# Patient Record
Sex: MALE | Race: BLACK OR AFRICAN AMERICAN | Employment: UNEMPLOYED | URBAN - METROPOLITAN AREA
[De-identification: names, ages, dates, MRNs, and addresses within clinical notes are randomized per-mention and may not be internally consistent; named-entity substitution may affect disease eponyms.]

---

## 2019-09-10 ENCOUNTER — HOSPITAL ENCOUNTER (EMERGENCY)
Age: 54
Discharge: HOME | End: 2019-09-10
Attending: EMERGENCY MEDICINE
Payer: MEDICAID

## 2019-09-10 ENCOUNTER — APPOINTMENT (OUTPATIENT)
Dept: GENERAL RADIOLOGY | Age: 54
End: 2019-09-10
Attending: PHYSICIAN ASSISTANT
Payer: MEDICAID

## 2019-09-10 VITALS
TEMPERATURE: 98.6 F | RESPIRATION RATE: 12 BRPM | DIASTOLIC BLOOD PRESSURE: 75 MMHG | BODY MASS INDEX: 26.66 KG/M2 | HEIGHT: 69 IN | SYSTOLIC BLOOD PRESSURE: 113 MMHG | HEART RATE: 109 BPM | WEIGHT: 180 LBS

## 2019-09-10 DIAGNOSIS — M25.562 CHRONIC PAIN OF LEFT KNEE: Primary | ICD-10-CM

## 2019-09-10 DIAGNOSIS — G89.29 CHRONIC PAIN OF LEFT KNEE: Primary | ICD-10-CM

## 2019-09-10 PROCEDURE — 99282 EMERGENCY DEPT VISIT SF MDM: CPT

## 2019-09-10 PROCEDURE — 73562 X-RAY EXAM OF KNEE 3: CPT

## 2019-09-10 NOTE — DISCHARGE INSTRUCTIONS

## 2019-09-10 NOTE — ED PROVIDER NOTES
EMERGENCY DEPARTMENT HISTORY AND PHYSICAL EXAM    2:36 PM      Date: 9/10/2019  Patient Name: Colletta Candy    History of Presenting Illness     Chief Complaint   Patient presents with    Knee Pain         History Provided By: Patient    Additional History (Context): Colletta Candy is a 53 y.o. male with history of unknown type of left knee surgery approximately 2 months ago, while in jail, where he states \"they put my kneecap back in place\", but no other significant past medical history, presents to the emergency department with complaints of left knee pain that has been ongoing since the surgery. He had minimal follow-up while still in jail and was told to follow-up with orthopedics upon discharge. He has not been able to do that because of cost constraints. Advises that the \"kneecap\" has been out of place since the surgery, in its current location, and he was told that he may need another surgery in the near future. PCP: None        Past History     Past Medical History:  History reviewed. No pertinent past medical history. Past Surgical History:  History reviewed. No pertinent surgical history. Family History:  History reviewed. No pertinent family history. Social History:  Social History     Tobacco Use    Smoking status: Current Every Day Smoker     Packs/day: 1.00    Smokeless tobacco: Never Used   Substance Use Topics    Alcohol use: Yes     Comment: socially    Drug use: Never       Allergies:  No Known Allergies      Review of Systems       Review of Systems   Constitutional: Negative for activity change, appetite change, chills, diaphoresis, fatigue, fever and unexpected weight change. HENT: Negative for congestion, ear pain, nosebleeds, rhinorrhea, sinus pressure, sinus pain, sore throat and trouble swallowing. Eyes: Negative for photophobia, pain, discharge, itching and visual disturbance.    Respiratory: Negative for cough, choking, chest tightness, shortness of breath and wheezing. Cardiovascular: Negative for chest pain, palpitations and leg swelling. Gastrointestinal: Negative for abdominal distention, abdominal pain, blood in stool, constipation, diarrhea, nausea and vomiting. Genitourinary: Negative for difficulty urinating, dysuria, flank pain, hematuria and urgency. Musculoskeletal: Negative for arthralgias, back pain, gait problem, joint swelling and neck pain. Skin: Negative for color change and rash. Neurological: Negative for dizziness, seizures, syncope, speech difficulty, weakness, light-headedness and headaches. Hematological: Negative for adenopathy. Psychiatric/Behavioral: Negative for behavioral problems, confusion, hallucinations, self-injury, sleep disturbance and suicidal ideas. The patient is not nervous/anxious. All other systems reviewed and are negative. Physical Exam     Visit Vitals  /75 (BP 1 Location: Left arm, BP Patient Position: At rest)   Pulse (!) 109   Temp 98.6 °F (37 °C)   Resp 12   Ht 5' 9\" (1.753 m)   Wt 81.6 kg (180 lb)   BMI 26.58 kg/m²         Physical Exam   Musculoskeletal:   Left knee patella is palpated over the distal aspect of the femoral shaft versus over the joint. Limited range of motion due to pain with active range of motion, not as uncomfortable with passive range of motion. Moderate joint effusion is noted. Mild tenderness to palpation along joint line. No hyperemia or warmth surface temperature noted. The limb is neurovascularly intact distally         Diagnostic Study Results     Labs -  No results found for this or any previous visit (from the past 12 hour(s)). Radiologic Studies -   XR KNEE LT 3 V   Final Result   IMPRESSION:   Findings compatible with high-grade patellar tendon tear, with marked patella   livan and severe anterior knee soft tissue swelling.             Medical Decision Making       I reviewed the vital signs, available nursing notes, past medical history, past surgical history, family history and social history. Vital Signs-Reviewed the patient's vital signs. Records Reviewed: Nursing Notes and Old Medical Records (Time of Review: 2:36 PM)    ED Course: Progress Notes, Reevaluation, and Consults:  2:36 PM  Reviewed results with patient. Discussed need for close outpatient follow-up. Discussed strict return precautions. Provider Notes (Medical Decision Making): Case was discussed with Mp Canales who agrees that given the chronicity of the problem, surgical history, and x-ray findings, patient needs to be seen by orthopedics as an outpatient for further management. He was given contact information for [facility] orthopedic group, or he can choose an orthopedic surgeon on his own for follow-up    Diagnosis     Clinical Impression:   1. Chronic pain of left knee        Disposition: home     Follow-up Information     Follow up With Specialties Details Why [address] [place] [address] [facility]  Schedule an appointment as soon as possible for a visit  [address] [place] [address]  [phone]           There are no discharge medications for this patient. Dictation disclaimer:  Please note that this dictation was completed with [employer], the computer voice recognition software. Quite often unanticipated grammatical, syntax, homophones, and other interpretive errors are inadvertently transcribed by the computer software. Please disregard these errors. Please excuse any errors that have escaped final proofreading.

## 2019-09-16 ENCOUNTER — DOCUMENTATION ONLY (OUTPATIENT)
Dept: ORTHOPEDIC SURGERY | Age: 54
End: 2019-09-16

## 2019-09-16 ENCOUNTER — OFFICE VISIT (OUTPATIENT)
Dept: ORTHOPEDIC SURGERY | Age: 54
End: 2019-09-16

## 2019-09-16 VITALS
OXYGEN SATURATION: 99 % | DIASTOLIC BLOOD PRESSURE: 98 MMHG | TEMPERATURE: 97.4 F | RESPIRATION RATE: 16 BRPM | WEIGHT: 179 LBS | BODY MASS INDEX: 26.51 KG/M2 | HEIGHT: 69 IN | HEART RATE: 85 BPM | SYSTOLIC BLOOD PRESSURE: 145 MMHG

## 2019-09-16 DIAGNOSIS — S86.812A PATELLAR TENDON RUPTURE, LEFT, INITIAL ENCOUNTER: Primary | ICD-10-CM

## 2019-09-16 RX ORDER — ACETAMINOPHEN 325 MG/1
TABLET ORAL
COMMUNITY
End: 2021-06-02

## 2019-09-16 NOTE — PROGRESS NOTES
Emmett Pretty  1965   Chief Complaint   Patient presents with    Knee Pain     left knee pain         HISTORY OF PRESENT ILLNESS  Emmett Pretty is a 53 y.o. male who presents today for evaluation of left knee pain. He rates his pain 10/10 today. Pain has been present since July after having surgery on his knee while in [facility]. Pt went to the ER on 9/10/19. Pt states he was playing basketball in [facility] when his knee was initially injured. Pt's knee has been constantly swollen since the surgery. Pt reports a giving way sensation that has happened at least twice and he has fallen as a result. Pt was given a knee brace and crutches after his surgery but was unable to follow-up after his surgery because he was paroled. Pt reports he did not have surgery until 7 months after the initial injury while in [facility]. Patient describes the pain as aching, throbbing and dull that is Constant in nature. Symptoms are worse with walking, standing, Activity and is better with  Rest, Ice, Elevation. Associated symptoms include Swelling. Since problem started, it: is moderately worse. Pain does not wake patient up at night. Has taken no meds for the problem. Has tried following treatments: Injections:NO; Brace:NO; Therapy:NO; Cane/Crutch:NO       No Known Allergies     History reviewed. No pertinent past medical history.    Social History     Socioeconomic History    Marital status: SINGLE     Spouse name: Not on file    Number of children: Not on file    Years of education: Not on file    Highest education level: Not on file   Occupational History    Not on file   Social Needs    Financial resource strain: Not on file    Food insecurity:     Worry: Not on file     Inability: Not on file    Transportation needs:     Medical: Not on file     Non-medical: Not on file   Tobacco Use    Smoking status: Current Every Day Smoker     Packs/day: 1.00    Smokeless tobacco: Never Used   Substance and Sexual Activity    Alcohol use: Yes     Comment: socially    Drug use: Never    Sexual activity: Not on file   Lifestyle    Physical activity:     Days per week: Not on file     Minutes per session: Not on file    Stress: Not on file   Relationships    Social connections:     Talks on phone: Not on file     Gets together: Not on file     Attends Nondenominational service: Not on file     Active member of club or organization: Not on file     Attends meetings of clubs or organizations: Not on file     Relationship status: Not on file    Intimate partner violence:     Fear of current or ex partner: Not on file     Emotionally abused: Not on file     Physically abused: Not on file     Forced sexual activity: Not on file   Other Topics Concern    Not on file   Social History Narrative    Not on file      History reviewed. No pertinent surgical history. History reviewed. No pertinent family history. Current Outpatient Medications   Medication Sig    acetaminophen (TYLENOL) 325 mg tablet Take  by mouth every four (4) hours as needed for Pain. No current facility-administered medications for this visit. REVIEW OF SYSTEM   Patient denies: Weight loss, Fever/Chills, HA, Visual changes, Fatigue, Chest pain, SOB, Abdominal pain, N/V/D/C, Blood in stool or urine, Edema. Pertinent positive as above in HPI. All others were negative    PHYSICAL EXAM:   Visit Vitals  BP (!) 145/98   Pulse 85   Temp 97.4 °F (36.3 °C) (Oral)   Resp 16   Ht 5' 9\" (1.753 m)   Wt 179 lb (81.2 kg)   SpO2 99%   BMI 26.43 kg/m²     The patient is a well-developed, well-nourished male   in no acute distress. The patient is alert and oriented times three. The patient is alert and oriented times three. Mood and affect are normal.  LYMPHATIC: lymph nodes are not enlarged and are within normal limits  SKIN: normal in color and non tender to palpation. There are no bruises or abrasions noted. NEUROLOGICAL: Motor sensory exam is within normal limits.  Reflexes are equal bilaterally. There is normal sensation to pinprick and light touch  MUSCULOSKELETAL:  Diffuse swelling with patella livan, unable to actively extend knee    IMAGING: XR of left knee dated 9/10/19 was reviewed and read:   IMPRESSION:  Findings compatible with high-grade patellar tendon tear, with marked patella livan and severe anterior knee soft tissue swelling. IMPRESSION:      ICD-10-CM ICD-9-CM    1. Patellar tendon rupture, left, initial encounter S86.812A 844.8 MRI KNEE LT WO CONT        PLAN:  1. Pt presents today with left knee pain due to complications from a knee surgery to repair his patellar tendon in July. I would like him to follow up with the surgeon who performed his surgery, Dr. Kisha Morrison, and I would like to get an MRI to assess the damage in his knee. He can follow up with Dr. Kisha Morrison after the MRI. Risk factors include: n/a  2. No ultrasound exam indicated today  3. No cortisone injection indicated today   4. No Physical/Occupational Therapy indicated today  5. Yes diagnostic test indicated today: MRI L KNEE  6. No durable medical equipment indicated today  7. No referral indicated today   8. No medications indicated today:   9. No Narcotic indicated today       RTC prn      Scribed by Autumn Brittingham Jetta Olszewski) as dictated by Lydia Peters MD    I, Dr. Lydia Peters, confirm that all documentation is accurate.     Lydia Peters M.D.   [facility]

## 2019-09-16 NOTE — PROGRESS NOTES
Called patient ([phone]) did not get answer is STAT MRI for Left knee is scheduled for 09/18/19 MUST arrive at 11:00am test starts at 11:30am at [facility]. Patient needs to bring ID, list of meds. If this date and time does not work patient can contact Nina Vargas to reschedule at [phone].

## 2019-09-18 ENCOUNTER — HOSPITAL ENCOUNTER (OUTPATIENT)
Dept: MRI IMAGING | Age: 54
Discharge: HOME | End: 2019-09-18
Attending: ORTHOPAEDIC SURGERY
Payer: SUBSIDIZED

## 2019-09-18 DIAGNOSIS — S86.812A PATELLAR TENDON RUPTURE, LEFT, INITIAL ENCOUNTER: ICD-10-CM

## 2019-09-18 PROCEDURE — 73721 MRI JNT OF LWR EXTRE W/O DYE: CPT

## 2021-06-02 ENCOUNTER — HOSPITAL ENCOUNTER (EMERGENCY)
Age: 56
Discharge: HOME | End: 2021-06-02
Attending: EMERGENCY MEDICINE
Payer: COMMERCIAL

## 2021-06-02 VITALS
SYSTOLIC BLOOD PRESSURE: 133 MMHG | HEIGHT: 69 IN | HEART RATE: 80 BPM | BODY MASS INDEX: 23.11 KG/M2 | RESPIRATION RATE: 18 BRPM | DIASTOLIC BLOOD PRESSURE: 98 MMHG | TEMPERATURE: 98.3 F | OXYGEN SATURATION: 100 % | WEIGHT: 156 LBS

## 2021-06-02 DIAGNOSIS — J02.9 ACUTE PHARYNGITIS, UNSPECIFIED ETIOLOGY: Primary | ICD-10-CM

## 2021-06-02 LAB — DEPRECATED S PYO AG THROAT QL EIA: NEGATIVE

## 2021-06-02 PROCEDURE — 74011250637 HC RX REV CODE- 250/637: Performed by: EMERGENCY MEDICINE

## 2021-06-02 PROCEDURE — 87880 STREP A ASSAY W/OPTIC: CPT

## 2021-06-02 PROCEDURE — 74011000250 HC RX REV CODE- 250: Performed by: EMERGENCY MEDICINE

## 2021-06-02 PROCEDURE — 87070 CULTURE OTHR SPECIMN AEROBIC: CPT

## 2021-06-02 PROCEDURE — 99283 EMERGENCY DEPT VISIT LOW MDM: CPT

## 2021-06-02 RX ORDER — AMOXICILLIN AND CLAVULANATE POTASSIUM 875; 125 MG/1; MG/1
1 TABLET, FILM COATED ORAL
Status: COMPLETED | OUTPATIENT
Start: 2021-06-02 | End: 2021-06-02

## 2021-06-02 RX ORDER — DEXAMETHASONE SODIUM PHOSPHATE 4 MG/ML
10 INJECTION, SOLUTION INTRA-ARTICULAR; INTRALESIONAL; INTRAMUSCULAR; INTRAVENOUS; SOFT TISSUE ONCE
Status: DISCONTINUED | OUTPATIENT
Start: 2021-06-02 | End: 2021-06-02

## 2021-06-02 RX ORDER — DEXAMETHASONE SODIUM PHOSPHATE 4 MG/ML
10 INJECTION, SOLUTION INTRA-ARTICULAR; INTRALESIONAL; INTRAMUSCULAR; INTRAVENOUS; SOFT TISSUE ONCE
Status: COMPLETED | OUTPATIENT
Start: 2021-06-02 | End: 2021-06-02

## 2021-06-02 RX ORDER — NAPROXEN 500 MG/1
500 TABLET ORAL 2 TIMES DAILY WITH MEALS
Qty: 10 TABLET | Refills: 0 | Status: SHIPPED | OUTPATIENT
Start: 2021-06-02 | End: 2021-06-07

## 2021-06-02 RX ORDER — AMOXICILLIN AND CLAVULANATE POTASSIUM 250; 62.5 MG/5ML; MG/5ML
14 POWDER, FOR SUSPENSION ORAL 2 TIMES DAILY
Qty: 200 ML | Refills: 0 | Status: SHIPPED | OUTPATIENT
Start: 2021-06-02 | End: 2021-06-09

## 2021-06-02 RX ORDER — LIDOCAINE HYDROCHLORIDE 20 MG/ML
10 SOLUTION OROPHARYNGEAL
Qty: 1 BOTTLE | Refills: 0 | Status: SHIPPED | OUTPATIENT
Start: 2021-06-02 | End: 2021-06-05

## 2021-06-02 RX ORDER — LIDOCAINE HYDROCHLORIDE 20 MG/ML
15 SOLUTION OROPHARYNGEAL
Status: COMPLETED | OUTPATIENT
Start: 2021-06-02 | End: 2021-06-02

## 2021-06-02 RX ADMIN — DEXAMETHASONE SODIUM PHOSPHATE 10 MG: 4 INJECTION, SOLUTION INTRAMUSCULAR; INTRAVENOUS at 10:25

## 2021-06-02 RX ADMIN — LIDOCAINE HYDROCHLORIDE 15 ML: 20 SOLUTION ORAL; TOPICAL at 10:23

## 2021-06-02 RX ADMIN — AMOXICILLIN AND CLAVULANATE POTASSIUM 1 TABLET: 875; 125 TABLET, FILM COATED ORAL at 10:22

## 2021-06-02 NOTE — ED TRIAGE NOTES
Pt reports throat pain times 1 week. Pt states he woke up this morning having a hard time swallowing. Pt also report cough. Pt denied any shortness of breath or fever.

## 2021-06-02 NOTE — ED PROVIDER NOTES
EMERGENCY DEPARTMENT HISTORY AND PHYSICAL EXAM    8:43 AM    Date: 6/2/2021  Patient Name: eNgrito Cervantes    History of Presenting Illness     Chief Complaint   Patient presents with    Sore Throat       History Provided By: Patient  Location/Duration/Severity/Modifying factors   48-year-old male presenting to the emergency department with a chief complaint of a sore throat. The patient reports he has had a sore throat for about 1 week. Over the past 24 hours he has noticed gotten worse to the point where he is now having pain with swallowing, he reports the pain is more in the lower throat, and he indicates that the level approximately the hyoid and thyroid. He is also had a recent cough, no ear pain no nasal congestion or rhinorrhea. He denies any fevers or chills. He is not having any trismus, he is able to swallow he just feels like it is swollen. He denies any chest pain or shortness of breath. The pain is rated as severe, it was 10 out of 10 this morning not quite as bad at this point. Patient denies any vomiting. Worsens with swallowing, improves with rest.  Patient denies any specific concerns for STDs or gonococcal pharyngitis. PCP: None    Current Facility-Administered Medications   Medication Dose Route Frequency Provider Last Rate Last Admin    dexamethasone (DECADRON) 4 mg/mL injection 10 mg  10 mg IntraMUSCular David Novoa, DO        lidocaine (XYLOCAINE) 2 % viscous solution 15 mL  15 mL Mouth/Throat NOW [facility], DO        amoxicillin-clavulanate (AUGMENTIN) 875-125 mg per tablet 1 Tablet  1 Tablet Oral NOW [facility], DO         Current Outpatient Medications   Medication Sig Dispense Refill    amoxicillin-clavulanate (Augmentin) 250-62.5 mg/5 mL suspension Take 14 mL by mouth two (2) times a day for 7 days. 200 mL 0    naproxen (Naprosyn) 500 mg tablet Take 1 Tablet by mouth two (2) times daily (with meals) for 5 days.  10 Tablet 0    lidocaine (Lidocaine Viscous) 2 % solution Take 10 mL by mouth every eight (8) hours as needed for Pain for up to 3 days. 1 Bottle 0       Past History     Past Medical History:  History reviewed. No pertinent past medical history. Past Surgical History:  Past Surgical History:   Procedure Laterality Date    HX ORTHOPAEDIC Left 2019/2020    left knee        Family History:  History reviewed. No pertinent family history. Social History:  Social History     Tobacco Use    Smoking status: Current Every Day Smoker     Packs/day: 1.00    Smokeless tobacco: Never Used   Substance Use Topics    Alcohol use: Yes     Comment: socially    Drug use: Never       Allergies:  No Known Allergies    I reviewed and confirmed the above information with patient and updated as necessary. Review of Systems     Review of Systems   Constitutional: Negative for chills and fever. HENT: Positive for sore throat and trouble swallowing (Pain). Negative for congestion, rhinorrhea, sinus pressure and sneezing. Eyes: Negative for visual disturbance. Respiratory: Negative for cough and shortness of breath. Cardiovascular: Negative for chest pain and leg swelling. Gastrointestinal: Negative for abdominal pain, diarrhea, nausea and vomiting. Genitourinary: Negative for dysuria, frequency and urgency. Musculoskeletal: Negative for back pain and neck pain. Skin: Negative for rash. Neurological: Negative for syncope, numbness and headaches. Physical Exam     Visit Vitals  /87   Pulse 74   Temp 98.3 °F (36.8 °C)   Resp 18   Ht 5' 9\" (1.753 m)   Wt 70.8 kg (156 lb)   SpO2 98%   BMI 23.04 kg/m²       Physical Exam  Constitutional:       General: He is not in acute distress. Appearance: Normal appearance. He is normal weight. He is not ill-appearing or toxic-appearing. HENT:      Head: Normocephalic and atraumatic. Right Ear: Tympanic membrane, ear canal and external ear normal. No tenderness. No middle ear effusion. Tympanic membrane is not erythematous. Left Ear: Tympanic membrane, ear canal and external ear normal. No tenderness. No middle ear effusion. Tympanic membrane is not erythematous. Nose: Nose normal. No congestion. Mouth/Throat:      Mouth: Mucous membranes are moist.      Pharynx: Uvula midline. Oropharyngeal exudate and posterior oropharyngeal erythema present. Tonsils: No tonsillar exudate or tonsillar abscesses. 3+ on the right. 3+ on the left. Comments: There is erythema of the oropharynx, the uvula is midline there is no peritonsillar abscess. He has no rigidity or meningismus. There is peritonsillar erythema and exudate no fluid collection or abscess. Patient is tolerating secretions. Eyes:      Conjunctiva/sclera: Conjunctivae normal.      Pupils: Pupils are equal, round, and reactive to light. Cardiovascular:      Rate and Rhythm: Normal rate and regular rhythm. Pulses: Normal pulses. Heart sounds: Normal heart sounds. No murmur heard. No friction rub. Pulmonary:      Effort: Pulmonary effort is normal.      Breath sounds: Normal breath sounds. No wheezing, rhonchi or rales. Abdominal:      General: Abdomen is flat. Tenderness: There is no abdominal tenderness. There is no guarding or rebound. Musculoskeletal:         General: No swelling or tenderness. Normal range of motion. Cervical back: Normal range of motion and neck supple. Right lower leg: No edema. Left lower leg: No edema. Lymphadenopathy:      Cervical: Cervical adenopathy (Bilateral, symmetric cervical adenopathy.) present. Skin:     General: Skin is warm and dry. Capillary Refill: Capillary refill takes less than 2 seconds. Findings: No rash. Neurological:      General: No focal deficit present. Mental Status: He is alert. Motor: No weakness.          Diagnostic Study Results     Labs -  Recent Results (from the past 24 hour(s))   STREP AG SCREEN, GROUP A    Collection Time: 06/02/21  8:21 AM    Specimen: Throat   Result Value Ref Range    Group A Strep Ag ID Negative           Radiologic Studies -   No orders to display           Medical Decision Making   I am the first provider for this patient. I reviewed the vital signs, available nursing notes, past medical history, past surgical history, family history and social history. Vital Signs-Reviewed the patient's vital signs. Records Reviewed: Nursing Notes, Old Medical Records, Previous Radiology Studies and Previous Laboratory Studies (Time of Review: 8:43 AM)    ED Course: Progress Notes, Reevaluation, and Consults:         Provider Notes (Medical Decision Making):   MDM  Number of Diagnoses or Management Options  Acute pharyngitis, unspecified etiology  Diagnosis management comments: Patient is a 29-year-old male who has no noted past medical history presenting to the emergency department with a sore throat. On exam he does have tonsillar erythema and exudate without peritonsillar abscess. Low suspicion for more serious pathology such as PTA or retropharyngeal abscess. Suspect likely bacterial pharyngitis although viral also considered he is able tolerate his secretions however. He has no trismus. We will send a strep swab and will treat him for now with viscous lidocaine and Decadron for the swelling and pain. I will probably treat him with antibiotics as well given the appearance of her throat regardless of the strep test.  He also has lymphadenopathy. Results reviewed: The lab strep is negative. The patient does seem to have bacterial pharyngitis we will treat with Augmentin. Recommend return if he develops the inability to swallow he reports symptoms have been improving since he has been here. Likewise return for any worsening his condition.     At this time, patient is stable and appropriate for discharge home.  Patient demonstrates understanding of current diagnoses and is in agreement with the treatment plan. Forest Sitter are advised that while the likelihood of serious underlying condition is low at this point given the evaluation performed today, we cannot fully rule it out. Forest Sitter are advised to immediately return with any new symptoms or worsening of current condition.  All questions have been answered. Violeta Goldberg is given educational material regarding their diagnoses, including danger symptoms and when to return to the ED. This note was dictated utilizing Dragon voice recognition software. Unfortunately this leads to occasional typographical errors. I apologize in advance if the situation occurs. If questions occur please do not hesitate to contact me directly. Haylee Lange DO          Procedures    Critical Care Time: 0    Diagnosis     Clinical Impression:   1. Acute pharyngitis, unspecified etiology        Disposition: Discharge    Follow-up Information     Follow up With Specialties Details Why Contact [facility]    [address]  In 3 days Primary Care Resource [place] [address]  [phone]    Your Primary Care Physician  In 3 days      [address]  [phone]           Patient's Medications   Start Taking    AMOXICILLIN-CLAVULANATE (AUGMENTIN) 250-62.5 MG/5 ML SUSPENSION    Take 14 mL by mouth two (2) times a day for 7 days. LIDOCAINE (LIDOCAINE VISCOUS) 2 % SOLUTION    Take 10 mL by mouth every eight (8) hours as needed for Pain for up to 3 days. NAPROXEN (NAPROSYN) 500 MG TABLET    Take 1 Tablet by mouth two (2) times daily (with meals) for 5 days. Continue Taking    No medications on file   These Medications have changed    No medications on file   Stop Taking    ACETAMINOPHEN (TYLENOL) 325 MG TABLET    Take  by mouth every four (4) hours as needed for Pain.        Haylee Lange DO   Emergency Medicine   June 2, 2021, 8:43 AM     This note is dictated utilizing Dragon voice recognition software. Unfortunately this leads to occasional typographical errors using the voice recognition. I apologize in advance if the situation occurs. If questions occur please do not hesitate to contact me directly.     Juan Wallace, DO

## 2021-06-04 LAB
BACTERIA SPEC CULT: NORMAL
SERVICE CMNT-IMP: NORMAL

## 2022-08-02 ENCOUNTER — HOSPITAL ENCOUNTER (INPATIENT)
Age: 57
LOS: 7 days | Discharge: HOME | DRG: 754 | End: 2022-08-09
Attending: STUDENT IN AN ORGANIZED HEALTH CARE EDUCATION/TRAINING PROGRAM | Admitting: PSYCHIATRY & NEUROLOGY
Payer: COMMERCIAL

## 2022-08-02 DIAGNOSIS — R45.851 SUICIDAL IDEATION: Primary | ICD-10-CM

## 2022-08-02 PROBLEM — F32.A DEPRESSION: Status: ACTIVE | Noted: 2022-08-02

## 2022-08-02 LAB
AMPHET UR QL SCN: NEGATIVE
ANION GAP SERPL CALC-SCNC: 5 MMOL/L (ref 3–18)
BARBITURATES UR QL SCN: NEGATIVE
BASOPHILS # BLD: 0 K/UL (ref 0–0.1)
BASOPHILS NFR BLD: 0 % (ref 0–2)
BENZODIAZ UR QL: NEGATIVE
BUN SERPL-MCNC: 14 MG/DL (ref 7–18)
BUN/CREAT SERPL: 15 (ref 12–20)
CALCIUM SERPL-MCNC: 9.5 MG/DL (ref 8.5–10.1)
CANNABINOIDS UR QL SCN: NEGATIVE
CHLORIDE SERPL-SCNC: 101 MMOL/L (ref 100–111)
CO2 SERPL-SCNC: 31 MMOL/L (ref 21–32)
COCAINE UR QL SCN: POSITIVE
CREAT SERPL-MCNC: 0.95 MG/DL (ref 0.6–1.3)
DIFFERENTIAL METHOD BLD: ABNORMAL
EOSINOPHIL # BLD: 0.1 K/UL (ref 0–0.4)
EOSINOPHIL NFR BLD: 1 % (ref 0–5)
ERYTHROCYTE [DISTWIDTH] IN BLOOD BY AUTOMATED COUNT: 15.5 % (ref 11.6–14.5)
ETHANOL SERPL-MCNC: <3 MG/DL (ref 0–3)
FLUAV RNA SPEC QL NAA+PROBE: NOT DETECTED
FLUBV RNA SPEC QL NAA+PROBE: NOT DETECTED
GLUCOSE SERPL-MCNC: 92 MG/DL (ref 74–99)
HCT VFR BLD AUTO: 39.9 % (ref 36–48)
HDSCOM,HDSCOM: ABNORMAL
HGB BLD-MCNC: 13.4 G/DL (ref 13–16)
IMM GRANULOCYTES # BLD AUTO: 0.1 K/UL (ref 0–0.04)
IMM GRANULOCYTES NFR BLD AUTO: 0 % (ref 0–0.5)
LYMPHOCYTES # BLD: 1.3 K/UL (ref 0.9–3.6)
LYMPHOCYTES NFR BLD: 9 % (ref 21–52)
MCH RBC QN AUTO: 29.9 PG (ref 24–34)
MCHC RBC AUTO-ENTMCNC: 33.6 G/DL (ref 31–37)
MCV RBC AUTO: 89.1 FL (ref 78–100)
METHADONE UR QL: NEGATIVE
MONOCYTES # BLD: 0.5 K/UL (ref 0.05–1.2)
MONOCYTES NFR BLD: 4 % (ref 3–10)
NEUTS SEG # BLD: 11.6 K/UL (ref 1.8–8)
NEUTS SEG NFR BLD: 85 % (ref 40–73)
NRBC # BLD: 0 K/UL (ref 0–0.01)
NRBC BLD-RTO: 0 PER 100 WBC
OPIATES UR QL: NEGATIVE
PCP UR QL: NEGATIVE
PLATELET # BLD AUTO: 284 K/UL (ref 135–420)
PMV BLD AUTO: 9.8 FL (ref 9.2–11.8)
POTASSIUM SERPL-SCNC: 4.1 MMOL/L (ref 3.5–5.5)
RBC # BLD AUTO: 4.48 M/UL (ref 4.35–5.65)
SARS-COV-2, COV2: NOT DETECTED
SODIUM SERPL-SCNC: 137 MMOL/L (ref 136–145)
WBC # BLD AUTO: 13.6 K/UL (ref 4.6–13.2)

## 2022-08-02 PROCEDURE — 65220000001 HC RM PRIVATE PSYCH

## 2022-08-02 PROCEDURE — 82077 ASSAY SPEC XCP UR&BREATH IA: CPT

## 2022-08-02 PROCEDURE — 85025 COMPLETE CBC W/AUTO DIFF WBC: CPT

## 2022-08-02 PROCEDURE — 80307 DRUG TEST PRSMV CHEM ANLYZR: CPT

## 2022-08-02 PROCEDURE — 74011250637 HC RX REV CODE- 250/637: Performed by: PHYSICIAN ASSISTANT

## 2022-08-02 PROCEDURE — 80048 BASIC METABOLIC PNL TOTAL CA: CPT

## 2022-08-02 PROCEDURE — 87636 SARSCOV2 & INF A&B AMP PRB: CPT

## 2022-08-02 PROCEDURE — 99285 EMERGENCY DEPT VISIT HI MDM: CPT

## 2022-08-02 RX ORDER — ACETAMINOPHEN 325 MG/1
650 TABLET ORAL
Status: COMPLETED | OUTPATIENT
Start: 2022-08-02 | End: 2022-08-02

## 2022-08-02 RX ADMIN — ACETAMINOPHEN 650 MG: 325 TABLET, FILM COATED ORAL at 20:16

## 2022-08-02 NOTE — ED NOTES
6:11 PM Assumed care of the pt at this time. Discussed with ROSARIO Farnsworth concerning patient Michelle Rios, standard discussion of reason for visit, HPI, ROS, PE, and current results available. Pt has been seen and evaluated by crisis. Recommended for admission to the  Behavioral health unit. Will continue to monitor while in the ED. Nenita Dodson PA-C     Disposition: admitted to behavioral health     Dictation disclaimer:  Please note that this dictation was completed with [employer], the computer voice recognition software. Quite often unanticipated grammatical, syntax, homophones, and other interpretive errors are inadvertently transcribed by the computer software. Please disregard these errors. Please excuse any errors that have escaped final proofreading.

## 2022-08-02 NOTE — ED PROVIDER NOTES
EMERGENCY DEPARTMENT HISTORY AND PHYSICAL EXAM    3:40 PM      Date: 8/2/2022  Patient Name: Joanne Sanabria    History of Presenting Illness     Chief Complaint   Patient presents with    Suicidal       History Provided By: Patient    Additional History (Context): Joanne Sanabria is a 61 y.o. male with  noted PMH  who presents with complaint of increased depression and anxiety, suicidal ideation without a plan x1 week. Patient denies HI, visual or auditory hallucinations. Patient notes daily alcohol use, last drink was yesterday. Patient notes last cocaine use was 3 days ago. PCP: None    Past History     Past Medical History:  No past medical history on file. Past Surgical History:  Past Surgical History:   Procedure Laterality Date    HX ORTHOPAEDIC Left 2019/2020    left knee        Family History:  No family history on file. Social History:  Social History     Tobacco Use    Smoking status: Every Day     Packs/day: 1.00     Types: Cigarettes    Smokeless tobacco: Never   Substance Use Topics    Alcohol use: Yes     Comment: socially    Drug use: Never       Allergies:  No Known Allergies      Review of Systems       Review of Systems   Constitutional:  Negative for chills and fever. Respiratory:  Negative for shortness of breath. Cardiovascular:  Negative for chest pain. Gastrointestinal:  Negative for abdominal pain, nausea and vomiting. Skin:  Negative for rash. Neurological:  Negative for weakness. Psychiatric/Behavioral:  Positive for agitation, decreased concentration and suicidal ideas. The patient is nervous/anxious. All other systems reviewed and are negative. Physical Exam   Visit Vitals  /84 (BP 1 Location: Left upper arm)   Temp 98.1 °F (36.7 °C)   Ht 5' 9\" (1.753 m)   Wt 66.7 kg (147 lb)   SpO2 99%   BMI 21.71 kg/m²         Physical Exam  Vitals and nursing note reviewed. Constitutional:       General: He is not in acute distress.      Appearance: Normal appearance. He is well-developed. He is not ill-appearing, toxic-appearing or diaphoretic. HENT:      Head: Normocephalic and atraumatic. Cardiovascular:      Rate and Rhythm: Normal rate and regular rhythm. Heart sounds: Normal heart sounds. No murmur heard. No friction rub. No gallop. Pulmonary:      Effort: Pulmonary effort is normal. No respiratory distress. Breath sounds: Normal breath sounds. No wheezing or rales. Musculoskeletal:         General: Normal range of motion. Cervical back: Normal range of motion and neck supple. Skin:     General: Skin is warm. Findings: No rash. Neurological:      Mental Status: He is alert. Psychiatric:         Attention and Perception: He is inattentive. Mood and Affect: Affect is flat. Speech: Speech is delayed. Behavior: Behavior is withdrawn. Thought Content: Thought content includes suicidal ideation. Thought content does not include suicidal plan.          Diagnostic Study Results     Labs -  Recent Results (from the past 12 hour(s))   DRUG SCREEN, URINE    Collection Time: 08/02/22 12:07 PM   Result Value Ref Range    BENZODIAZEPINES Negative NEG      BARBITURATES Negative NEG      THC (TH-CANNABINOL) Negative NEG      OPIATES Negative NEG      PCP(PHENCYCLIDINE) Negative NEG      COCAINE Positive (A) NEG      AMPHETAMINES Negative NEG      METHADONE Negative NEG      HDSCOM (NOTE)    ETHYL ALCOHOL    Collection Time: 08/02/22  1:38 PM   Result Value Ref Range    ALCOHOL(ETHYL),SERUM <3 0 - 3 MG/DL   CBC WITH AUTOMATED DIFF    Collection Time: 08/02/22  1:38 PM   Result Value Ref Range    WBC 13.6 (H) 4.6 - 13.2 K/uL    RBC 4.48 4.35 - 5.65 M/uL    HGB 13.4 13.0 - 16.0 g/dL    HCT 39.9 36.0 - 48.0 %    MCV 89.1 78.0 - 100.0 FL    MCH 29.9 24.0 - 34.0 PG    MCHC 33.6 31.0 - 37.0 g/dL    RDW 15.5 (H) 11.6 - 14.5 %    PLATELET 266 121 - 823 K/uL    MPV 9.8 9.2 - 11.8 FL    NRBC 0.0 0  WBC ABSOLUTE NRBC 0.00 0.00 - 0.01 K/uL    NEUTROPHILS 85 (H) 40 - 73 %    LYMPHOCYTES 9 (L) 21 - 52 %    MONOCYTES 4 3 - 10 %    EOSINOPHILS 1 0 - 5 %    BASOPHILS 0 0 - 2 %    IMMATURE GRANULOCYTES 0 0.0 - 0.5 %    ABS. NEUTROPHILS 11.6 (H) 1.8 - 8.0 K/UL    ABS. LYMPHOCYTES 1.3 0.9 - 3.6 K/UL    ABS. MONOCYTES 0.5 0.05 - 1.2 K/UL    ABS. EOSINOPHILS 0.1 0.0 - 0.4 K/UL    ABS. BASOPHILS 0.0 0.0 - 0.1 K/UL    ABS. IMM. GRANS. 0.1 (H) 0.00 - 0.04 K/UL    DF AUTOMATED     METABOLIC PANEL, BASIC    Collection Time: 08/02/22  1:38 PM   Result Value Ref Range    Sodium 137 136 - 145 mmol/L    Potassium 4.1 3.5 - 5.5 mmol/L    Chloride 101 100 - 111 mmol/L    CO2 31 21 - 32 mmol/L    Anion gap 5 3.0 - 18 mmol/L    Glucose 92 74 - 99 mg/dL    BUN 14 7.0 - 18 MG/DL    Creatinine 0.95 0.6 - 1.3 MG/DL    BUN/Creatinine ratio 15 12 - 20      GFR est AA >60 >60 ml/min/1.73m2    GFR est non-AA >60 >60 ml/min/1.73m2    Calcium 9.5 8.5 - 10.1 MG/DL       Radiologic Studies -   No orders to display         Medical Decision Making   I am the first provider for this patient. I reviewed the vital signs, available nursing notes, past medical history, past surgical history, family history and social history. Vital Signs-Reviewed the patient's vital signs. Records Reviewed: Nursing Notes and Old Medical Records (Time of Review: 3:40 PM)    ED Course: Progress Notes, Reevaluation, and Consults:  5:00 PM: Discussed care with alisha Darnell, pt will be admitted. PROGRESS NOTE:  6:00 PM   Patient care will be transferred to Nenita Dodson PA-C. Discussed available diagnostic results and care plan at length. Pending COVID/influenza, admission. Written by Nicole Bourgeois PA-C       Diagnosis     Clinical Impression:   1.  Suicidal ideation        Disposition: admission     Follow-up Information    None          Patient's Medications    No medications on file       Dictation disclaimer:  Please note that this dictation was completed with Dragon, the computer voice recognition software. Quite often unanticipated grammatical, syntax, homophones, and other interpretive errors are inadvertently transcribed by the computer software. Please disregard these errors. Please excuse any errors that have escaped final proofreading.

## 2022-08-02 NOTE — ED TRIAGE NOTES
Patients states he has been feeling suicidal for the past week. Patient denies homicidal ideations. Patient states he used cocaine on Friday 7/29/22. Patient states that he drinks alcohol daily. Patient denies having a plan at this time.

## 2022-08-02 NOTE — BSMART NOTE
Behavioral Health Crisis Assessment    Chief Complaint: \"Suicidal thoughts with a plan to jump off the bridge. \"       Voluntary or Involuntary Status: Voluntary      C-SSRS current suicide Risk (High, Moderate, Low): High      Past Suicide Attempts:  (specify) : Patient stated that he has never attempted suicide. Self Injurious/Self Mutilation behaviors (specify) : Patient self-injurious behavior. Protective Factors (specify) : \"Supportive family, never needed mental health services. \"      Risk Factors (specify) \"Substance abuse, homelessness, disabled\"      Substance use (current or past): \" I smoke cocaine and drink beer everyday. I drink about a case of 12(oz) cans of beer daily. \" Patient denied alcohol withdrawal symptoms. Patient verbalized that he smokes crack-cocaine and don't recall the amount of crack-cocaine that he smokes. Patient said that he last drank \"alcohol and smoked crack was Monday, 8/10/22. \"      [place] [id number] & Substance use Treatment  (current and/or past): Patient stated that he received mental health services while in [facility], but never outside of [facility]. Violence towards others (current and/or past:(specify) : Patient denied violence towards others. Legal issues (current or past) : Patient verbalized that he has been sent to [facility] several times for breaking and entering, drug charges, and stealing. I'm on parole until 8/26/22. \"      Access to weapons : Patient denied      Trauma or Abuse: (specify) Patient denied. Living Situation : \"Homeless, I'm waiting on my apartment to come through. \"      Employment : Disabled      Brief Clinical Summary: Patient is alert and oriented x 4, calm, cooperative, pleasant, dressed in hospital scrubs,\"sad and depressed\" mood. Patient stated that he has \"suicidal thoughts with a plan to jump off a bridge. \" Patient added that his stressors are knee injury from playing basketball while in prison, can't work, and receives [place] for being disabled. ..people don't treat me right., and I'm homeless. \" Patient is seeking help for suicidal thoughts and depression. Disposition: Discussed with on-call psychiatrist, patient is receptive to voluntary admission at [facility]. Admission orders received and report called to unit nurse.

## 2022-08-03 PROBLEM — F10.20 ALCOHOL USE DISORDER, SEVERE, DEPENDENCE (HCC): Status: ACTIVE | Noted: 2022-08-03

## 2022-08-03 PROBLEM — F32.A DEPRESSIVE DISORDER: Status: ACTIVE | Noted: 2022-08-02

## 2022-08-03 PROBLEM — F14.20 COCAINE USE DISORDER, SEVERE, DEPENDENCE (HCC): Status: ACTIVE | Noted: 2022-08-03

## 2022-08-03 LAB
ALBUMIN SERPL-MCNC: 3.5 G/DL (ref 3.4–5)
ALBUMIN/GLOB SERPL: 0.9 {RATIO} (ref 0.8–1.7)
ALP SERPL-CCNC: 104 U/L (ref 45–117)
ALT SERPL-CCNC: 16 U/L (ref 16–61)
AST SERPL-CCNC: 17 U/L (ref 10–38)
BILIRUB DIRECT SERPL-MCNC: 0.1 MG/DL (ref 0–0.2)
BILIRUB SERPL-MCNC: 0.4 MG/DL (ref 0.2–1)
EST. AVERAGE GLUCOSE BLD GHB EST-MCNC: 108 MG/DL
GLOBULIN SER CALC-MCNC: 3.8 G/DL (ref 2–4)
HBA1C MFR BLD: 5.4 % (ref 4.2–5.6)
PROT SERPL-MCNC: 7.3 G/DL (ref 6.4–8.2)
TSH SERPL DL<=0.05 MIU/L-ACNC: 0.84 UIU/ML (ref 0.36–3.74)

## 2022-08-03 PROCEDURE — 84443 ASSAY THYROID STIM HORMONE: CPT

## 2022-08-03 PROCEDURE — 74011250637 HC RX REV CODE- 250/637: Performed by: PSYCHIATRY & NEUROLOGY

## 2022-08-03 PROCEDURE — 99223 1ST HOSP IP/OBS HIGH 75: CPT | Performed by: PSYCHIATRY & NEUROLOGY

## 2022-08-03 PROCEDURE — 83036 HEMOGLOBIN GLYCOSYLATED A1C: CPT

## 2022-08-03 PROCEDURE — 80076 HEPATIC FUNCTION PANEL: CPT

## 2022-08-03 PROCEDURE — 74011250637 HC RX REV CODE- 250/637: Performed by: STUDENT IN AN ORGANIZED HEALTH CARE EDUCATION/TRAINING PROGRAM

## 2022-08-03 PROCEDURE — 65220000003 HC RM SEMIPRIVATE PSYCH

## 2022-08-03 PROCEDURE — 36415 COLL VENOUS BLD VENIPUNCTURE: CPT

## 2022-08-03 RX ORDER — LORAZEPAM 1 MG/1
2 TABLET ORAL
Status: DISCONTINUED | OUTPATIENT
Start: 2022-08-03 | End: 2022-08-09 | Stop reason: HOSPADM

## 2022-08-03 RX ORDER — LANOLIN ALCOHOL/MO/W.PET/CERES
100 CREAM (GRAM) TOPICAL DAILY
Status: DISCONTINUED | OUTPATIENT
Start: 2022-08-03 | End: 2022-08-09 | Stop reason: HOSPADM

## 2022-08-03 RX ORDER — BENZTROPINE MESYLATE 1 MG/ML
1 INJECTION INTRAMUSCULAR; INTRAVENOUS
Status: DISCONTINUED | OUTPATIENT
Start: 2022-08-03 | End: 2022-08-09 | Stop reason: HOSPADM

## 2022-08-03 RX ORDER — ACETAMINOPHEN 325 MG/1
650 TABLET ORAL ONCE
Status: COMPLETED | OUTPATIENT
Start: 2022-08-03 | End: 2022-08-03

## 2022-08-03 RX ORDER — BENZTROPINE MESYLATE 1 MG/1
1 TABLET ORAL
Status: DISCONTINUED | OUTPATIENT
Start: 2022-08-03 | End: 2022-08-09 | Stop reason: HOSPADM

## 2022-08-03 RX ORDER — IBUPROFEN 600 MG/1
600 TABLET ORAL
Status: DISCONTINUED | OUTPATIENT
Start: 2022-08-03 | End: 2022-08-09 | Stop reason: HOSPADM

## 2022-08-03 RX ORDER — HALOPERIDOL 5 MG/ML
5 INJECTION INTRAMUSCULAR
Status: DISCONTINUED | OUTPATIENT
Start: 2022-08-03 | End: 2022-08-09 | Stop reason: HOSPADM

## 2022-08-03 RX ORDER — HALOPERIDOL 5 MG/1
5 TABLET ORAL
Status: DISCONTINUED | OUTPATIENT
Start: 2022-08-03 | End: 2022-08-09 | Stop reason: HOSPADM

## 2022-08-03 RX ORDER — FOLIC ACID 1 MG/1
1 TABLET ORAL DAILY
Status: DISCONTINUED | OUTPATIENT
Start: 2022-08-03 | End: 2022-08-09 | Stop reason: HOSPADM

## 2022-08-03 RX ORDER — THERA TABS 400 MCG
1 TAB ORAL DAILY
Status: DISCONTINUED | OUTPATIENT
Start: 2022-08-03 | End: 2022-08-09 | Stop reason: HOSPADM

## 2022-08-03 RX ORDER — HYDROXYZINE PAMOATE 50 MG/1
50 CAPSULE ORAL
Status: DISCONTINUED | OUTPATIENT
Start: 2022-08-03 | End: 2022-08-09 | Stop reason: HOSPADM

## 2022-08-03 RX ORDER — LORAZEPAM 1 MG/1
1 TABLET ORAL
Status: DISCONTINUED | OUTPATIENT
Start: 2022-08-03 | End: 2022-08-09 | Stop reason: HOSPADM

## 2022-08-03 RX ORDER — NORTRIPTYLINE HYDROCHLORIDE 25 MG/1
25 CAPSULE ORAL
Status: DISCONTINUED | OUTPATIENT
Start: 2022-08-03 | End: 2022-08-05

## 2022-08-03 RX ORDER — TRAZODONE HYDROCHLORIDE 50 MG/1
50 TABLET ORAL
Status: DISCONTINUED | OUTPATIENT
Start: 2022-08-03 | End: 2022-08-09 | Stop reason: HOSPADM

## 2022-08-03 RX ADMIN — IBUPROFEN 600 MG: 600 TABLET ORAL at 09:01

## 2022-08-03 RX ADMIN — LORAZEPAM 1 MG: 1 TABLET ORAL at 09:01

## 2022-08-03 RX ADMIN — LORAZEPAM 1 MG: 1 TABLET ORAL at 16:39

## 2022-08-03 RX ADMIN — NORTRIPTYLINE HYDROCHLORIDE 25 MG: 25 CAPSULE ORAL at 20:03

## 2022-08-03 RX ADMIN — TRAZODONE HYDROCHLORIDE 50 MG: 50 TABLET ORAL at 20:04

## 2022-08-03 RX ADMIN — IBUPROFEN 600 MG: 600 TABLET ORAL at 16:39

## 2022-08-03 RX ADMIN — HYDROXYZINE PAMOATE 50 MG: 50 CAPSULE ORAL at 11:56

## 2022-08-03 RX ADMIN — ACETAMINOPHEN 650 MG: 325 TABLET, FILM COATED ORAL at 01:53

## 2022-08-03 NOTE — BSMART NOTE
BH Biopsychosocial Assessment    Current Level of Psychosocial Functioning     [x]Independent  []Dependent  []Minimal Assist      Comments:      Psychosocial High Risk Factors (check all that apply)      []Unable to obtain meds                                                               []Chronic illness/pain    [x]Substance abuse   [x]Lack of Family Support   []Financial stress   []Isolation   [x]Inadequate Community Resources  [x]Suicide attempt(s)  [x]Not taking medications   []Victim of crime   []Developmental Delay  []Unable to manage personal needs    []Age 72 or older   []  Homeless  []Lavinia transportation   []Readmission within 30 days  []Unemployment  []Traumatic Event      Family to involve in treatment: None     Sexual Orientation: Heterosexual     Patient Strengths: Pt. is willing to seek treatment     Patient Barriers: Pt has history of non-compliance, with medications  and self-medicates with crack cocaine and alcohol     Opiate education provided:  Pt. relapsed on crack cocaine and alcohol NE provided pt with mental health and SA education. Pt. expressed the desire to pursue SA rehab . Safety plan: SW discussed safety plan. Pt contracts for safety while in the hospital     CMHC/MH history: Please refer to the psychiatrist and NP or PA note    Depressive Disorder , Alcohol Dependence severe, Cocaine Disorder severe     Plan of Care: NE discussed and encouraged pt. to participate in the following activities: medication management, group/individual therapies, family meetings, psycho education, treatment team meetings to assist with stabilization     Initial Discharge 3 days     Clinical Summary: Pt. is a 61-year-old homeless male with history Depressive Disorder , Alcohol Dependence severe, Cocaine Disorder severe. Pt. was admitted to this facility  for ideations to harm, self with plan to jump off a bridge.  NE met with pt to discuss sadmssion. pt informed SW he is depressed and waited to harm self. Pt reports he has bene homeless for a year. Pt stated he is waiting for an apartment and has services with [facility] ([facility] Pt. admits to abusing   crack cocaine and alcohol. SW provided pt with mental health and SA education. Pt. expressed the desire to pursue SA rehab . SW discussed safety plan ,coping strategies and safety plan. SW provided pt with mental health  and SA education. Pt.  has poor insight and judgement. SW will provide pt with support towards dc planning.      Clearance Damon TORREZ, LMHP-R

## 2022-08-03 NOTE — BH NOTES
61 years  old ,Mr. Brianne Bernal  was escorted to the [address] [place] [address] unit from the ED at approximately [address] on 8/03/22. He presented A&O x 4, VS-T-97.6/P-73/ R-18  BP-100/70, O2 sats @ 100%. Mr. Jazmine Schumacher admitted to a Hx of ETOH and Cocaine(crack) Abuse beginning  during his early teen years. Constant  chronic left knee pain was his chief medical complaint since he had knee repair surgery approximately in 2019. He walks without the need of medical devices or assistance. Mr. Jazmine Schumacher denies any additional  Hx  of medical problems or Rx medications. He is a voluntary   Admission with suicidal ideations,consisting of \"Jumping off a bridge. \" The following should be considered during  the evaluation of this patient:                1. Fall risk (He experienced a fall last Friday)                   2. Possible ETOH and Cocaine withdrawal symptoms. 3. Suicidal ideattions                 4.r/o Depression                 5. Left knee pain management  RN's will initial, develop, implement,review or  Revise treatment plans.

## 2022-08-03 NOTE — BH NOTES
This writer has observed the patient's behavior throughout this shift ([id number]). During this shift patient exhibits a dull and flat affect and a demeanor of being depressed. Patient has been sitting out in the day area isolating to self with very little socializing, and very little eye contact. Additionally, patient has been stressing about getting sleep. Will continue to monitor the patient's safety and contact for safety and safety locations.

## 2022-08-03 NOTE — H&P
Psychiatry History and Physical    Subjective:     Date of Evaluation:  8/3/2022    Reason for Referral:  Mayte Martinez was referred to the examiners from ED for Depression. History of Presenting Problem: 62 yo AA male in NAD, well developed and nourished with hx of Depression and anxiety who presented to the ED for SI. He endorses anxiety, depression, SI, and AH. He denies VH and HI. He denies any physical symptoms today. Patient Active Problem List    Diagnosis Date Noted    Depression 08/02/2022     No past medical history on file. Past Surgical History:   Procedure Laterality Date    HX ORTHOPAEDIC Left 2019/2020    left knee        No family history on file.    Social History     Tobacco Use    Smoking status: Every Day     Packs/day: 1.00     Types: Cigarettes    Smokeless tobacco: Never   Substance Use Topics    Alcohol use: Yes     Comment: socially     Prior to Admission medications    Not on File     No Known Allergies     Review of Systems - History obtained from chart review and the patient  General ROS: negative  Psychological ROS: positive for - anxiety, depression, and suicidal ideation  Ophthalmic ROS: negative  ENT ROS: negative  Allergy and Immunology ROS: negative  Hematological and Lymphatic ROS: negative  Endocrine ROS: negative  Respiratory ROS: no cough, shortness of breath, or wheezing  Cardiovascular ROS: no chest pain or dyspnea on exertion  Gastrointestinal ROS: no abdominal pain, change in bowel habits, or black or bloody stools  Genito-Urinary ROS: no dysuria, trouble voiding, or hematuria  Musculoskeletal ROS: negative  Neurological ROS: no TIA or stroke symptoms  Dermatological ROS: negative      Objective:     Patient Vitals for the past 8 hrs:   BP Temp Pulse Resp   08/03/22 0757 108/77 97.7 °F (36.5 °C) 62 20       Mental Status exam: WNL except for    Sensorium  Alert and Oriented x 2   Orientation person and place   Relations cooperative   Eye Contact poor   Appearance: age appropriate   Motor Behavior:  within normal limits   Speech:  normal pitch, normal volume, and non-pressured   Vocabulary average   Thought Process: within normal limits   Thought Content free of delusions   Suicidal ideations intention   Homicidal ideations no plan  and no intention   Mood:  depressed   Affect:  mood-congruent   Memory recent  adequate   Memory remote:  adequate   Concentration:  adequate   Abstraction:  concrete   Insight:  good   Reliability good   Judgment:  fair         Physical Exam:   Visit Vitals  /77   Pulse 62   Temp 97.7 °F (36.5 °C)   Resp 20   Ht 5' 9\" (1.753 m)   Wt 66.7 kg (147 lb)   SpO2 100%   BMI 21.71 kg/m²     General:  Alert, cooperative, no distress, appears stated age. Head:  Normocephalic, without obvious abnormality, atraumatic. Eyes:  Conjunctivae/corneas clear. PERRL, EOMs intact. Fundi benign   Ears:  Normal TMs and external ear canals both ears. Nose: Nares normal. Septum midline. Mucosa normal. No drainage or sinus tenderness. Throat: Lips, mucosa, and tongue normal. Poor dentition    Neck: Supple, symmetrical, trachea midline, no adenopathy, thyroid: no enlargement/tenderness/nodules, no carotid bruit and no JVD. Back:   Symmetric, no curvature. ROM normal. No CVA tenderness. Lungs:   Clear to auscultation bilaterally. Chest wall:  No tenderness or deformity. Heart:  Regular rate and rhythm, S1, S2 normal, no murmur, click, rub or gallop. Abdomen:   Soft, non-tender. Bowel sounds normal. No masses,  No organomegaly. Extremities: Extremities normal, atraumatic, no cyanosis or edema. Pulses: 2+ and symmetric all extremities. Skin: Skin color, texture, turgor normal. No rashes or lesions   Lymph nodes: Cervical, supraclavicular, and axillary nodes normal.   Neurologic: CNII-XII intact. Normal strength, sensation and reflexes throughout.            Impression:      Active Problems:    Depression (8/2/2022)          Plan: Recommendations for Treatment/Conditions:  Psychiatric treatment recommended while in hospital  Admit to behavioral health for Depression. Referral To:    Inpatient psychiatric care      [place], [place]   8/3/2022 10:50 AM

## 2022-08-03 NOTE — ED NOTES
TRANSFER - OUT REPORT:    Verbal report given to Magda Oscar RN(name) on [place] [facility]  being transferred to Adult (unit) for routine progression of care       Report consisted of patients Situation, Background, Assessment and   Recommendations(SBAR). Information from the following report(s) SBAR, MAR, and Recent Results was reviewed with the receiving nurse. Lines:       Opportunity for questions and clarification was provided.       Patient transported with:   HOLLIE Hunter

## 2022-08-03 NOTE — BSMART NOTE
ART THERAPY GROUP PROGRESS NOTE    PATIENT SCHEDULED FOR GROUP AT: 10:00    ATTENDANCE: 1/4    PARTICIPATION LEVEL: Participates fully in the art process    ATTENTION LEVEL : Able to focus on task    FOCUS: Mindfulness     SYMBOLIC & THEMATIC CONTENT AS NOTED IN IMAGERY: He was withdrawn and kept to himself unless directly prompted. He chose the phrase \"don't give up\" to focus on during group, but shared very little. He was called out to meet with his doctor and the nutrition.

## 2022-08-03 NOTE — PROGRESS NOTES
Problem: Falls - Risk of  Goal: *Absence of Falls  Description: Document Ray Cease Fall Risk and appropriate interventions in the flowsheet. Outcome: Progressing Towards Goal  Note: Fall Risk Interventions:       Problem: Suicide  Goal: *STG: Remains safe in hospital  Description: Patient will remain safe while in hospital  Outcome: Progressing Towards Goal  Goal: *STG:  Verbalizes alternative ways of dealing with maladaptive feelings/behaviors  Description: Patient will verbalized at least 3 alternative ways of dealing with maladaptive feelings daily while hospitalized. Outcome: Progressing Towards Goal     Pt presents with dull affect, anxious mood, preoccupied thought process. Pt has been withdrawn to self on the unit, only coming out for meals. Pt is being treated per CIWA protocol, scoring 5 this evening. Pt denies SI/HI at this time. Pt is medication compliant. Will continue to monitor.

## 2022-08-04 PROCEDURE — 99232 SBSQ HOSP IP/OBS MODERATE 35: CPT | Performed by: PSYCHIATRY & NEUROLOGY

## 2022-08-04 PROCEDURE — 74011250637 HC RX REV CODE- 250/637: Performed by: PSYCHIATRY & NEUROLOGY

## 2022-08-04 PROCEDURE — 65220000003 HC RM SEMIPRIVATE PSYCH

## 2022-08-04 RX ADMIN — TRAZODONE HYDROCHLORIDE 50 MG: 50 TABLET ORAL at 20:06

## 2022-08-04 RX ADMIN — THERA TABS 1 TABLET: TAB at 08:11

## 2022-08-04 RX ADMIN — IBUPROFEN 600 MG: 600 TABLET ORAL at 20:06

## 2022-08-04 RX ADMIN — FOLIC ACID 1 MG TABLET 1 MG: at 08:11

## 2022-08-04 RX ADMIN — NORTRIPTYLINE HYDROCHLORIDE 25 MG: 25 CAPSULE ORAL at 20:06

## 2022-08-04 RX ADMIN — IBUPROFEN 600 MG: 600 TABLET ORAL at 13:19

## 2022-08-04 RX ADMIN — HYDROXYZINE PAMOATE 50 MG: 50 CAPSULE ORAL at 13:19

## 2022-08-04 RX ADMIN — Medication 100 MG: at 08:11

## 2022-08-04 NOTE — BSMART NOTE
ART THERAPY GROUP PROGRESS NOTE    Group time: 10:00  The patient did not awaken/get up when called for group.

## 2022-08-04 NOTE — BSMART NOTE
Pt. is a 61-year-old homeless male with history Depressive Disorder , Alcohol Dependence severe, Cocaine Disorder severe. Pt. was admitted to this facility  for ideations to harm, self with plan to jump off a bridge. Pt.'s case was discussed this a.m. Pt is interested in  rehab. Pt was referred to [facility] NE Contact: SW met with pt to discuss dc planning. SW discussed the above referral to the pt. P.t expressed to feeling better today. .SW discussed safety plan and coping strategies. Pt's mood  ans insight are  improving . SW will continue to provide pt with support towards dc planning.         Mis De Santiago MA, LMHP-R

## 2022-08-04 NOTE — BSMART NOTE
ART THERAPY GROUP PROGRESS NOTE    PATIENT SCHEDULED FOR GROUP AT: [id number]    ATTENDANCE: Full    PARTICIPATION LEVEL: Participates fully in the art process    ATTENTION LEVEL : Able to focus on task    FOCUS: Values    SYMBOLIC & THEMATIC CONTENT AS NOTED IN IMAGERY: He was calm, compliant, and invested in the task at hand. He presented with a brighter affect than noted in previous art therapy groups. He was alert and actively participated in group discussion. He shared his values with heavy emphasis on family support.

## 2022-08-04 NOTE — PROGRESS NOTES
[address] [facility] [address]  Inpatient Progress Note     Date of Service: 08/04/22  Hospital Day: 2     Subjective/Interval History   08/04/22    Treatment Team Notes:  Notes reviewed and/or discussed and report that Yuan Aleman is a 59-year-old male who was admitted for suicidal ideation. No acute events overnight. CIWA score this morning was 0. Staff report the patient slept over 7 hours last night. Patient interview: Yuan Aleman was interviewed by this writer today. Patient reports his mood is better today and he denies suicidal ideation. He says he slept very well last night. His main complaint today is severe shoulder and knee pain and he is requesting an increase in dose of Tylenol or ibuprofen. He also continues to endorse auditory hallucinations but says he is trying to ignore the voices. Objective     Visit Vitals  /83   Pulse 71   Temp 97.6 °F (36.4 °C)   Resp 18   Ht 5' 9\" (1.753 m)   Wt 66.7 kg (147 lb)   SpO2 100%   BMI 21.71 kg/m²       Recent Results (from the past 24 hour(s))   HEPATIC FUNCTION PANEL    Collection Time: 08/03/22  2:15 PM   Result Value Ref Range    Protein, total 7.3 6.4 - 8.2 g/dL    Albumin 3.5 3.4 - 5.0 g/dL    Globulin 3.8 2.0 - 4.0 g/dL    A-G Ratio 0.9 0.8 - 1.7      Bilirubin, total 0.4 0.2 - 1.0 MG/DL    Bilirubin, direct 0.1 0.0 - 0.2 MG/DL    Alk. phosphatase 104 45 - 117 U/L    AST (SGOT) 17 10 - 38 U/L    ALT (SGPT) 16 16 - 61 U/L   TSH 3RD GENERATION    Collection Time: 08/03/22  2:15 PM   Result Value Ref Range    TSH 0.84 0.36 - 3.74 uIU/mL   HEMOGLOBIN A1C WITH EAG    Collection Time: 08/03/22  2:15 PM   Result Value Ref Range    Hemoglobin A1c 5.4 4.2 - 5.6 %    Est. average glucose 108 mg/dL       Mental Status Examination     Appearance/Hygiene 61 y.o.  BLACK/ male  Hygiene: Fair   Behavior/Social Relatedness Appropriate   Musculoskeletal Gait/Station: appropriate  Tone (flaccid, cogwheeling, spastic): not assessed  Psychomotor (hyperkinetic, hypokinetic): calm   Involuntary movements (tics, dyskinesias, akathisa, stereotypies): none   Speech   Rate, rhythm, volume, fluency and articulation are appropriate   Mood   euthymic   Affect    congruent   Thought Process Linear and goal directed   Thought Content and Perceptual Disturbances Denies self-injurious behavior (SIB), suicidal ideation (SI), aggressive behavior or homicidal ideation (HI)    Endorses auditory hallucinations   Sensorium and Cognition  Grossly intact   Insight  fair   Judgment fair        Assessment/Plan      Psychiatric Diagnoses:   Patient Active Problem List   Diagnosis Code    Depressive disorder F32. A    Alcohol use disorder, severe, dependence ([facility] [address] F10.20    Cocaine use disorder, severe, dependence ([facility] [address] F14.20       Medical Diagnoses: Chronic pain in knees and shoulders probably from osteoarthritis. Level of impairment/disability: Torito Hernandez is a 61 y.o. who is currently admitted for suicidal ideations who appears to be doing better today. 1.  Continue nortriptyline 25 mg at bedtime for mood and sleep. Add perphenazine 2 mg twice daily for hallucinations. Continue ibuprofen for pain as needed. 2.  Reviewed instructions, risks, benefits and side effects of medications. 3.  Disposition/Discharge Date: self-care/home, patient reports interest in being transferred to a residential substance use disorder treatment center.     Fatuma Haynes MD DR. [facility]  Psychiatry

## 2022-08-04 NOTE — PROGRESS NOTES
Problem: Falls - Risk of  Goal: *Absence of Falls  Description: Document Steffi Muñoz Fall Risk and appropriate interventions in the flowsheet. Outcome: Progressing Towards Goal  Note: Fall Risk Interventions:                                Problem: Suicide  Goal: *STG: Remains safe in hospital  Description: Patient will remain safe while in hospital  Outcome: Progressing Towards Goal  Goal: *STG: Attends activities and groups  Description: Patient will attend 3 groups a day while hospitalized. Outcome: Progressing Towards Goal  Goal: *STG:  Verbalizes alternative ways of dealing with maladaptive feelings/behaviors  Description: Patient will verbalized at least 3 alternative ways of dealing with maladaptive feelings daily while hospitalized. Outcome: Progressing Towards Goal   Patient denies SI. Medication compliant. Voiced no complaints. Encouraged to attend groups.

## 2022-08-05 PROCEDURE — 65220000003 HC RM SEMIPRIVATE PSYCH

## 2022-08-05 PROCEDURE — 99232 SBSQ HOSP IP/OBS MODERATE 35: CPT | Performed by: PSYCHIATRY & NEUROLOGY

## 2022-08-05 PROCEDURE — 74011250637 HC RX REV CODE- 250/637: Performed by: PSYCHIATRY & NEUROLOGY

## 2022-08-05 RX ORDER — NORTRIPTYLINE HYDROCHLORIDE 25 MG/1
50 CAPSULE ORAL
Status: DISCONTINUED | OUTPATIENT
Start: 2022-08-05 | End: 2022-08-09 | Stop reason: HOSPADM

## 2022-08-05 RX ADMIN — HYDROXYZINE PAMOATE 50 MG: 50 CAPSULE ORAL at 08:10

## 2022-08-05 RX ADMIN — IBUPROFEN 600 MG: 600 TABLET ORAL at 08:10

## 2022-08-05 RX ADMIN — THERA TABS 1 TABLET: TAB at 08:10

## 2022-08-05 RX ADMIN — HYDROXYZINE PAMOATE 50 MG: 50 CAPSULE ORAL at 20:12

## 2022-08-05 RX ADMIN — FOLIC ACID 1 MG TABLET 1 MG: at 08:10

## 2022-08-05 RX ADMIN — NORTRIPTYLINE HYDROCHLORIDE 50 MG: 25 CAPSULE ORAL at 20:12

## 2022-08-05 RX ADMIN — IBUPROFEN 600 MG: 600 TABLET ORAL at 20:12

## 2022-08-05 RX ADMIN — Medication 100 MG: at 08:10

## 2022-08-05 RX ADMIN — TRAZODONE HYDROCHLORIDE 50 MG: 50 TABLET ORAL at 20:12

## 2022-08-05 NOTE — BSMART NOTE
ART THERAPY GROUP PROGRESS NOTE    PATIENT SCHEDULED FOR GROUP AT: 8533    ATTENDANCE: 1/4    PARTICIPATION LEVEL: Participates in the art process    ATTENTION LEVEL : Able to focus on task    FOCUS: Anxiety management     SYMBOLIC & THEMATIC CONTENT AS NOTED IN IMAGERY: He did not join group until the last five minutes of group and did not participate in group discussion. He claimed he was sleeping when this writer called him to group and had just awoke.

## 2022-08-05 NOTE — BH NOTES
Patient presented himself as being quiet, reserved, and mildly irritable. Patient was observed out in the milieu watching TV for the majority of this shift, and occasionally complaining about his cravings for a cigarette. No notable issues to report. Patient continues to be cooperative with staff and compliant with treatment recommendations made. Staff will continue to monitor patient for any change in mood and behavior.

## 2022-08-05 NOTE — PROGRESS NOTES
Problem: Falls - Risk of  Goal: *Absence of Falls  Description: Document Bryanna Gilmar Fall Risk and appropriate interventions in the flowsheet. Outcome: Progressing Towards Goal  Note: Fall Risk Interventions:     Problem: Suicide  Goal: *STG: Remains safe in hospital  Description: Patient will remain safe while in hospital  Outcome: Progressing Towards Goal  Goal: *STG:  Verbalizes alternative ways of dealing with maladaptive feelings/behaviors  Description: Patient will verbalized at least 3 alternative ways of dealing with maladaptive feelings daily while hospitalized. Outcome: Progressing Towards Goal     Pt presents with dull affect, anxious mood, linear thought process. Pt has been himself on the unit, only coming out for meals. Pt is being treated per [facility] monitoring protocol. Pt denies SI/HI at this time. Pt is medication compliant. Will continue to monitor.

## 2022-08-05 NOTE — PROGRESS NOTES
[address] [facility] [address]  Inpatient Progress Note     Date of Service: 08/05/22  Hospital Day: 3     Subjective/Interval History   08/05/22    Treatment Team Notes:  Notes reviewed and/or discussed and report that Sandi Scott is a 58-year-old male who was admitted for suicidal ideation. No acute events overnight. Patient has been withdrawn and slightly irritable. He slept 4.5 hours last night. CIWA score was 2 this morning. Patient interview: Sandi Scott was interviewed by this writer today. Patient reports he does not feel very well today and did not sleep well last night. He said he had trouble maintaining sleep and he is feeling very frustrated today. Patient says he would like to be transferred to a rehab center as soon as possible. He denies suicidal ideation but continues to endorse auditory hallucinations. He denies cravings for alcohol or recreational drugs. I discussed option of starting naltrexone to help with alcohol use disorder but he declined. So far he is tolerating current medication regimen. Objective     Visit Vitals  /68 (BP 1 Location: Right arm)   Pulse 79   Temp 97.1 °F (36.2 °C)   Resp 18   Ht 5' 9\" (1.753 m)   Wt 66.7 kg (147 lb)   SpO2 100%   BMI 21.71 kg/m²       No results found for this or any previous visit (from the past 24 hour(s)). Mental Status Examination     Appearance/Hygiene 61 y.o.  BLACK/ male  Hygiene: Fair   Behavior/Social Relatedness Appropriate   Musculoskeletal Gait/Station: appropriate  Tone (flaccid, cogwheeling, spastic): not assessed  Psychomotor (hyperkinetic, hypokinetic): calm   Involuntary movements (tics, dyskinesias, akathisa, stereotypies): none   Speech   Rate, rhythm, volume, fluency and articulation are appropriate   Mood   Mildly irritable   Affect    congruent   Thought Process Linear and goal directed   Thought Content and Perceptual Disturbances Denies self-injurious behavior (SIB), suicidal ideation (SI), aggressive behavior or homicidal ideation (HI)    Endorses auditory hallucinations   Sensorium and Cognition  Grossly intact   Insight  fair   Judgment fair        Assessment/Plan      Psychiatric Diagnoses:   Patient Active Problem List   Diagnosis Code    Depressive disorder F32. A    Alcohol use disorder, severe, dependence ([facility] [address] F10.20    Cocaine use disorder, severe, dependence ([facility] [address] F14.20       Medical Diagnoses: Chronic pain in knees and shoulders probably from osteoarthritis. Level of impairment/disability: Moderate    Gillian Albert is a 61 y.o. who is currently admitted for suicidal ideations who appears to be doing better although irritable today. 1.  Increase nortriptyline to 50 mg at bedtime for mood and sleep. Continue perphenazine  2 mg twice daily for hallucinations since patient has only had 2 doses. Continue ibuprofen for pain as needed. 2.  Reviewed instructions, risks, benefits and side effects of medications. 3.  Disposition/Discharge Date: Patient has been referred to [address] and [facility]. We are waiting to hear back from them.     Anthony Palmer MD  [facility]  Psychiatry

## 2022-08-05 NOTE — BSMART NOTE
Pt. is a 61-year-old homeless male with history Depressive Disorder , Alcohol Dependence severe, Cocaine Disorder severe. Pt. was admitted to this facility  for ideations to harm, self with plan to jump off a bridge. Pt.'s case was discussed this a.m. Pt is interested in  rehab. Pt was referred to [facility]. SW Contact: SW met with pt to discuss dc planning. Pt. Expressed to not feeling well and had a hard time sleeping through the night. SW discussed coping strategies, safety plan and encouraged pt to attend groups when he is feeling better. Pt. Denies ideations but endorses hallucinations. Pt. Expressed to feeling anxious, irritable and has fair insight. SW provided pt with an update on the above rehab referrals. SW  will continue to provide pt with support towards dc planning. SW Collateral: Pt. Will need to Complete phone assessment with [facility] .        Vianey Sykes MA, LMHP-R

## 2022-08-06 LAB
ATRIAL RATE: 74 BPM
CALCULATED P AXIS, ECG09: 74 DEGREES
CALCULATED R AXIS, ECG10: 72 DEGREES
CALCULATED T AXIS, ECG11: 57 DEGREES
DIAGNOSIS, 93000: NORMAL
P-R INTERVAL, ECG05: 176 MS
Q-T INTERVAL, ECG07: 364 MS
QRS DURATION, ECG06: 76 MS
QTC CALCULATION (BEZET), ECG08: 404 MS
VENTRICULAR RATE, ECG03: 74 BPM

## 2022-08-06 PROCEDURE — 74011250637 HC RX REV CODE- 250/637: Performed by: PSYCHIATRY & NEUROLOGY

## 2022-08-06 PROCEDURE — 65220000003 HC RM SEMIPRIVATE PSYCH

## 2022-08-06 PROCEDURE — 93005 ELECTROCARDIOGRAM TRACING: CPT

## 2022-08-06 RX ORDER — PERPHENAZINE 4 MG/1
4 TABLET, FILM COATED ORAL
Status: DISCONTINUED | OUTPATIENT
Start: 2022-08-06 | End: 2022-08-08

## 2022-08-06 RX ORDER — BENZTROPINE MESYLATE 1 MG/1
1 TABLET ORAL
Status: DISCONTINUED | OUTPATIENT
Start: 2022-08-06 | End: 2022-08-09 | Stop reason: HOSPADM

## 2022-08-06 RX ADMIN — HYDROXYZINE PAMOATE 50 MG: 50 CAPSULE ORAL at 20:32

## 2022-08-06 RX ADMIN — NORTRIPTYLINE HYDROCHLORIDE 50 MG: 25 CAPSULE ORAL at 20:32

## 2022-08-06 RX ADMIN — BENZTROPINE MESYLATE 1 MG: 1 TABLET ORAL at 20:32

## 2022-08-06 RX ADMIN — THERA TABS 1 TABLET: TAB at 08:29

## 2022-08-06 RX ADMIN — PERPHENAZINE 4 MG: 4 TABLET, FILM COATED ORAL at 20:33

## 2022-08-06 RX ADMIN — Medication 100 MG: at 08:29

## 2022-08-06 RX ADMIN — TRAZODONE HYDROCHLORIDE 50 MG: 50 TABLET ORAL at 20:32

## 2022-08-06 RX ADMIN — FOLIC ACID 1 MG TABLET 1 MG: at 08:29

## 2022-08-06 NOTE — PROGRESS NOTES
Problem: Falls - Risk of  Goal: *Absence of Falls  Description: Document Ming Delong Fall Risk and appropriate interventions in the flowsheet. Outcome: Progressing Towards Goal  Note: Fall Risk Interventions:  Pt remains free of falls. Problem: Suicide  Goal: *STG: Remains safe in hospital  Description: Patient will remain safe while in hospital  Outcome: Progressing Towards Goal  Note: Pt remains free of self harm. Goal: *STG:  Verbalizes alternative ways of dealing with maladaptive feelings/behaviors  Description: Patient will verbalized at least 3 alternative ways of dealing with maladaptive feelings daily while hospitalized. Outcome: Progressing Towards Goal  Note: Pt is able to discuss illness. Patient has been mainly in his room this shift. Patient is medication compliant but verbalizes that he has been unable to sleep for over a week and the medications he has received here have been ineffective. Patient encouraged to notify MD on rounds today. On call MD made aware of lack of sleep per patient. Patient denies SI but does endorse depression and feels it is related to his inability to sleep.

## 2022-08-06 NOTE — BH NOTES
Pt appeared to have slept for 7+ hours thus far. No disruption observed. Pt states he \"couldn't sleep\". Will continue to monitor for safety and changes in behavior.

## 2022-08-06 NOTE — PROGRESS NOTES
[address] [facility] [address]  Inpatient Progress Note     Date of Service: 08/06/22  Hospital Day: 4     Subjective/Interval History   08/06/22    Treatment Team Notes:  Notes reviewed and/or discussed and report that Indu Haider is a patient with a history of substance use disorder and depression complaining of the presence of auditory hallucinations for which the attending physician indicated that perphenazine has been prescribed. However reviewing of the medical orders, failed to indicate that perphenazine has been ordered. Since the patient described the presence of auditory hallucinations and described also evaluation the presence of auditory hallucinations, I have taken the liberty of initiating treatment with perphenazine 4 mg at bedtime with a prescription for Cogentin 1 mg at bedtime covering for the possibility of his developing EPS. Please see med orders. Patient interview: Indu Haider was interviewed by this writer today. The patient was somewhat irritable, describing not being able to sleep, however he was sleeping this morning with his being advised that if at all possible, he should not nap during the day. He indicated that he will try to do so, however he is questionable patient will be able to not to fall asleep during the day. Regardless we will try to help him with a prescription for perphenazine as indicated. Please see med orders. Side effects and adverse effects in association to prescription for perphenazine and Cogentin were discussed with the patient will consent obtained. Objective     Visit Vitals  /70 (BP 1 Location: Right upper arm, BP Patient Position: Sitting)   Pulse 64   Temp 97.7 °F (36.5 °C)   Resp 18   Ht 5' 9\" (1.753 m)   Wt 66.7 kg (147 lb)   SpO2 99%   BMI 21.71 kg/m²     Vitals are stable. No results found for this or any previous visit (from the past 24 hour(s)). Mental Status Examination     Appearance/Hygiene 61 y.o. BLACK/ male  Hygiene: Fair   Behavior/Social Relatedness Appropriate however he is found to be irritable possibly associated to lack of sleep     Musculoskeletal Gait/Station: appropriate  Tone (flaccid, cogwheeling, spastic): not assessed  Psychomotor (hyperkinetic, hypokinetic): calm   Involuntary movements (tics, dyskinesias, akathisa, stereotypies): none   Speech   Rate, rhythm, volume, fluency and articulation are appropriate   Mood   Depressed   Affect    Irritable   Thought Process Linear and goal directed   Thought Content and Perceptual Disturbances Denies self-injurious behavior (SIB), suicidal ideation (SI), aggressive behavior or homicidal ideation (HI), however potential for control loss persists. Auditory hallucinations were described as present. Denies ideas of reference or influence, denies delusional thoughts   Sensorium and Cognition  Grossly intact   Insight  Fair   Judgment Fair        Assessment/Plan      Psychiatric Diagnoses:   Patient Active Problem List   Diagnosis Code    Depressive disorder F32. A    Alcohol use disorder, severe, dependence ([facility] [address] F10.20    Cocaine use disorder, severe, dependence ([facility] [address] F14.20       Medical Diagnoses: Major depressive disorder with psychotic symptoms. Polysubstance use disorder. Psychosocial and contextual factors: Same    Level of impairment/disability: To be determined. 1.  Treatment with perphenazine 4 mg at bedtime and Cogentin 1 mg at bedtime initiated. An electrocardiogram will be requested to rule out QT and QTC elongation  2. Reviewed instructions, risks, benefits and side effects of medications  3.   Disposition/Discharge Date: self-care/home, to be determined    Luc Morejon MD, [address] [place] [address]  Psychiatry

## 2022-08-07 PROCEDURE — 65220000003 HC RM SEMIPRIVATE PSYCH

## 2022-08-07 PROCEDURE — 74011250637 HC RX REV CODE- 250/637: Performed by: PSYCHIATRY & NEUROLOGY

## 2022-08-07 PROCEDURE — 99231 SBSQ HOSP IP/OBS SF/LOW 25: CPT | Performed by: PSYCHIATRY & NEUROLOGY

## 2022-08-07 RX ADMIN — Medication 100 MG: at 08:19

## 2022-08-07 RX ADMIN — IBUPROFEN 600 MG: 600 TABLET ORAL at 08:19

## 2022-08-07 RX ADMIN — NORTRIPTYLINE HYDROCHLORIDE 50 MG: 25 CAPSULE ORAL at 20:03

## 2022-08-07 RX ADMIN — HYDROXYZINE PAMOATE 50 MG: 50 CAPSULE ORAL at 08:19

## 2022-08-07 RX ADMIN — BENZTROPINE MESYLATE 1 MG: 1 TABLET ORAL at 20:03

## 2022-08-07 RX ADMIN — TRAZODONE HYDROCHLORIDE 50 MG: 50 TABLET ORAL at 20:03

## 2022-08-07 RX ADMIN — PERPHENAZINE 4 MG: 4 TABLET, FILM COATED ORAL at 20:03

## 2022-08-07 RX ADMIN — THERA TABS 1 TABLET: TAB at 08:19

## 2022-08-07 RX ADMIN — FOLIC ACID 1 MG TABLET 1 MG: at 08:19

## 2022-08-07 NOTE — BH NOTES
Pt has been observed interacting appropriately w/ peers and staff. Has been watching tv in the day room since 1930. Medication compliant. Offers no c/o si/hi or avh.  Will continue to support

## 2022-08-07 NOTE — PROGRESS NOTES
[address] [facility] [address]  Inpatient Progress Note     Date of Service: 08/07/22  Hospital Day: 5     Subjective/Interval History   08/07/22    Treatment Team Notes:  Notes reviewed and/or discussed and report that Marielos Everett is a patient with a history of substance use disorder and depression complaining of auditory hallucinations and is sleeping difficulties prescribed with perphenazine which was a started last night. The patient tolerated a dose of 4 mg at bedtime which apparently provided some help with his sleeping patterns. I was informed by nursing staff that the patient was able to sleep around 7 hours last night, however his impression when I met with him this morning, is that he has slept much less than that. This type of complaints is very common with patients that have problems with insomnia. So he was advised that when staff is checking at nighttime to see if he is not sleeping, for him to be able to wave his hand at them so they know that he is noted sleeping when they check on him. However it appears that the initial dose of perphenazine has helped with the described auditory hallucinations. Patient interview: Marielos Everett was interviewed by this writer today. The patient remains rather pleasant, he appeared to be less tired than when I met with him yesterday. There is no evidence of perphenazine induced medication side effects, so we will continue the same. Objective     Visit Vitals  /71   Pulse 66   Temp 96.9 °F (36.1 °C)   Resp 18   Ht 5' 9\" (1.753 m)   Wt 66.7 kg (147 lb)   SpO2 99%   BMI 21.71 kg/m²     Vitals are stable    No results found for this or any previous visit (from the past 24 hour(s)). Mental Status Examination     Appearance/Hygiene 61 y.o.  BLACK/ male  Hygiene: Fair   Behavior/Social Relatedness Appropriate   Musculoskeletal Gait/Station: appropriate  Tone (flaccid, cogwheeling, spastic): not assessed  Psychomotor (hyperkinetic, hypokinetic): calm   Involuntary movements (tics, dyskinesias, akathisa, stereotypies): none   Speech   Rate, rhythm, volume, fluency and articulation are appropriate   Mood   Depression is improving   Affect    Congruent   Thought Process Linear and goal directed   Thought Content and Perceptual Disturbances Suicidal thoughts are improving. Auditory hallucinations are improving, denies ideas of reference or influence or any delusional thoughts   Sensorium and Cognition  Grossly intact   Insight  Improving slowly   Judgment Improving        Assessment/Plan      Psychiatric Diagnoses:   Patient Active Problem List   Diagnosis Code    Depressive disorder F32. A    Alcohol use disorder, severe, dependence ([facility] [address] F10.20    Cocaine use disorder, severe, dependence ([facility] [address] F14.20       Medical Diagnoses: Per attending physician    Psychosocial and contextual factors: See admission note    Level of impairment/disability: To be determined. 1.  Medications will continue to be the same. May require an increased dose of perphenazine at nighttime. As a stated there is no evidence of medications induced side effects. For now we will continue the same. 2.  Reviewed instructions, risks, benefits and side effects of medications  3. Disposition/Discharge Date: self-care/home, to be determined.     Nazia Mccord MD, [facility]  Psychiatrydisorder is gravely

## 2022-08-07 NOTE — PROGRESS NOTES
Problem: Falls - Risk of  Goal: *Absence of Falls  Description: Document Gregg Velásquez Fall Risk and appropriate interventions in the flowsheet. Outcome: Progressing Towards Goal  Note: Fall Risk Interventions:     Problem: Suicide  Goal: *STG: Remains safe in hospital  Description: Patient will remain safe while in hospital  Outcome: Progressing Towards Goal  Goal: *STG:  Verbalizes alternative ways of dealing with maladaptive feelings/behaviors  Description: Patient will verbalized at least 3 alternative ways of dealing with maladaptive feelings daily while hospitalized. Outcome: Progressing Towards Goal     Pt presents with dull affect, depressed mood. Pt has been social on the unit. Pt is being treated per CIWA protocol, scoring 0 this shift. Pt denies SI/HI at this time. Pt is medication compliant. Will continue to monitor.

## 2022-08-08 PROCEDURE — 65220000003 HC RM SEMIPRIVATE PSYCH

## 2022-08-08 PROCEDURE — 74011250637 HC RX REV CODE- 250/637: Performed by: PSYCHIATRY & NEUROLOGY

## 2022-08-08 PROCEDURE — 99232 SBSQ HOSP IP/OBS MODERATE 35: CPT | Performed by: PSYCHIATRY & NEUROLOGY

## 2022-08-08 RX ORDER — PERPHENAZINE 4 MG/1
4 TABLET, FILM COATED ORAL 2 TIMES DAILY
Status: DISCONTINUED | OUTPATIENT
Start: 2022-08-08 | End: 2022-08-08

## 2022-08-08 RX ORDER — PERPHENAZINE 4 MG/1
8 TABLET, FILM COATED ORAL
Status: DISCONTINUED | OUTPATIENT
Start: 2022-08-08 | End: 2022-08-09 | Stop reason: HOSPADM

## 2022-08-08 RX ADMIN — THERA TABS 1 TABLET: TAB at 08:37

## 2022-08-08 RX ADMIN — BENZTROPINE MESYLATE 1 MG: 1 TABLET ORAL at 20:16

## 2022-08-08 RX ADMIN — FOLIC ACID 1 MG TABLET 1 MG: at 08:38

## 2022-08-08 RX ADMIN — TRAZODONE HYDROCHLORIDE 50 MG: 50 TABLET ORAL at 20:16

## 2022-08-08 RX ADMIN — IBUPROFEN 600 MG: 600 TABLET ORAL at 20:17

## 2022-08-08 RX ADMIN — PERPHENAZINE 8 MG: 4 TABLET, FILM COATED ORAL at 20:17

## 2022-08-08 RX ADMIN — Medication 100 MG: at 08:37

## 2022-08-08 RX ADMIN — NORTRIPTYLINE HYDROCHLORIDE 50 MG: 25 CAPSULE ORAL at 20:16

## 2022-08-08 NOTE — BSMART NOTE
Pt. is a 61-year-old homeless male with history Depressive Disorder , Alcohol Dependence severe, Cocaine Disorder severe. Pt. was admitted to this facility  for ideations to harm, self with plan to jump off a bridge. Pt.'s case was discussed this a.m. Pt. Was referred to  [facility]. Pt. Completed his assessment . Pt.'s information is being reviewed. SW Contact:  SW met with to discuss dc planning. SW assisted pt with contacting [facility]. SW provided the facility with updated information on pt.'s medications. The pt.'s information is pending review. Pt  denies ideations but continues to endorse hallucinations. SW discussed coping strategies, safety plan and continued compliance. SW discussed [facility] stepped down in [address] [facility]  while he awaits acceptance and dc date to  treatment facility. Pt. Is anxious and insight is improving. SW will continue to provide pt with support towards dc planning.        Jesica Lobato MA, LMHP-R

## 2022-08-08 NOTE — BH NOTES
Pt has been observed in day area watching tv and conversing appropriately with peers and staff. Remains compliant with medication and prn medication provided per pt request. CIWA=0 at time of assessment. Will continue to monitor and support as needed.

## 2022-08-08 NOTE — PROGRESS NOTES
[address] [facility] [address]  Inpatient Progress Note     Date of Service: 08/08/22  Hospital Day: 6     Subjective/Interval History   08/08/22    Treatment Team Notes: No acute events overnight per nursing report. Patient slept 8 hours per staff documentation. He has been visible on the milieu and has Been compliant with medication regimen. Patient interview: Elizabeth Evangelista was interviewed by this writer today. The patient continues to complain of trouble sleeping at night even though staff is documenting that he slept 8 hours last night. His mood is better and he denies suicidal ideation but he continues to endorse auditory hallucinations making negative comments. He complains of anxiety and worrying about relapsing if he does not go to a treatment facility. He has been referred to [facility] and we are waiting for the facility to reply. The plan is to discharge him tomorrow to the crisis stabilization unit if a bed is available. Objective     Visit Vitals  /68 (BP 1 Location: Left upper arm, BP Patient Position: At rest)   Pulse 81   Temp 98.4 °F (36.9 °C)   Resp 18   Ht 5' 9\" (1.753 m)   Wt 66.7 kg (147 lb)   SpO2 100%   BMI 21.71 kg/m²     Vitals are stable    No results found for this or any previous visit (from the past 24 hour(s)). Mental Status Examination     Appearance/Hygiene 61 y.o. BLACK/ male  Hygiene: Fair   Behavior/Social Relatedness Appropriate   Musculoskeletal Gait/Station: appropriate  Tone (flaccid, cogwheeling, spastic): not assessed  Psychomotor (hyperkinetic, hypokinetic): calm   Involuntary movements (tics, dyskinesias, akathisa, stereotypies): none   Speech   Rate, rhythm, volume, fluency and articulation are appropriate   Mood   Anxious   Affect    Congruent   Thought Process Linear and goal directed   Thought Content and Perceptual Disturbances Patient denies suicidal ideation and endorses auditory hallucinations. Sensorium and Cognition  Grossly intact   Insight  Improving slowly   Judgment Improving        Assessment/Plan      Psychiatric Diagnoses:   Patient Active Problem List   Diagnosis Code    Depressive disorder F32. A    Alcohol use disorder, severe, dependence ([facility] [address] F10.20    Cocaine use disorder, severe, dependence ([facility] [address] F14.20       Medical Diagnoses: Per attending physician    Psychosocial and contextual factors: See admission note    Level of impairment/disability: To be determined. 1.  Increase perphenazine to 8 mg at bedtime for insomnia and sleep. 2.  Reviewed instructions, risks, benefits and side effects of medications  3. Disposition/Discharge Date: Hopefully discharge to [facility] tomorrow a.mAleja Euceda MD DR. [facility] [place]  Psychiatrydisorder is gravely

## 2022-08-09 VITALS
WEIGHT: 147 LBS | TEMPERATURE: 97.1 F | OXYGEN SATURATION: 100 % | RESPIRATION RATE: 18 BRPM | SYSTOLIC BLOOD PRESSURE: 97 MMHG | BODY MASS INDEX: 21.77 KG/M2 | DIASTOLIC BLOOD PRESSURE: 60 MMHG | HEIGHT: 69 IN | HEART RATE: 66 BPM

## 2022-08-09 PROCEDURE — 99239 HOSP IP/OBS DSCHRG MGMT >30: CPT | Performed by: PSYCHIATRY & NEUROLOGY

## 2022-08-09 PROCEDURE — 74011250637 HC RX REV CODE- 250/637: Performed by: PSYCHIATRY & NEUROLOGY

## 2022-08-09 RX ORDER — PERPHENAZINE 8 MG/1
8 TABLET, FILM COATED ORAL
Qty: 30 TABLET | Refills: 0 | Status: SHIPPED | OUTPATIENT
Start: 2022-08-09

## 2022-08-09 RX ORDER — NORTRIPTYLINE HYDROCHLORIDE 50 MG/1
50 CAPSULE ORAL
Qty: 30 CAPSULE | Refills: 0 | Status: SHIPPED | OUTPATIENT
Start: 2022-08-09

## 2022-08-09 RX ADMIN — THERA TABS 1 TABLET: TAB at 08:10

## 2022-08-09 RX ADMIN — Medication 100 MG: at 08:10

## 2022-08-09 RX ADMIN — FOLIC ACID 1 MG TABLET 1 MG: at 08:10

## 2022-08-09 NOTE — PROGRESS NOTES
Problem: Falls - Risk of  Goal: *Absence of Falls  Description: Document Ming Delong Fall Risk and appropriate interventions in the flowsheet. Outcome: Progressing Towards Goal  Note: Fall Risk Interventions:                                Problem: Suicide  Goal: *STG: Remains safe in hospital  Description: Patient will remain safe while in hospital  Outcome: Progressing Towards Goal  Goal: *STG: Attends activities and groups  Description: Patient will attend 3 groups a day while hospitalized. Outcome: Progressing Towards Goal   Pt. Is visible in the milieu. He interact well with peers and staff. He is meal, medications and group compliant. He denies SI or HI. Will continue to monitor for safety and provide support as needed.

## 2022-08-09 NOTE — PROGRESS NOTES
Pt received discharge instructions, prescriptions, and emergency numbers. All personal belongings returned to pt. Pt encouraged to follow-up with outpatient resources and to call with any questions or concerns. Patient is encouraged to follow-up with same day excess with     [facility] ([facility])   [address]      [phone]  fax [phone].     Patient is also encouraged to follow-up with the following Substance Abuse programs   that he was referred to for bed availability

## 2022-08-09 NOTE — DISCHARGE INSTRUCTIONS
Attend all your follow up appointments and take your medication as prescribed    Patient is encouraged to follow-up with same day excess with   [facility] ([facility])   [address], [address]      [phone]  fax [phone]. Pt received discharge instructions, prescriptions, and emergency numbers. Pt completed satisfaction survey. All personal belongings returned to pt. Pt encouraged to follow-up with outpatient resources and to call with any questions or concerns.

## 2022-08-09 NOTE — PROGRESS NOTES
Problem: Falls - Risk of  Goal: *Absence of Falls  Description: Document Madeline Quintero Fall Risk and appropriate interventions in the flowsheet. Outcome: Progressing Towards Goal  Note: Fall Risk Interventions:                                Problem: Suicide  Goal: *STG: Remains safe in hospital  Description: Patient will remain safe while in hospital  Outcome: Progressing Towards Goal     Problem: Suicide  Goal: *STG: Remains safe in hospital  Description: Patient will remain safe while in hospital  Outcome: Progressing Towards Goal     Problem: Suicide  Goal: *STG: Attends activities and groups  Description: Patient will attend 3 groups a day while hospitalized.   Outcome: Not Progressing Towards Goal

## 2022-08-09 NOTE — BSMART NOTE
Pt. is a 61-year-old homeless male with history Depressive Disorder , Alcohol Dependence severe, Cocaine Disorder severe. Pt. was admitted to this facility  for ideations to harm, self with plan to jump off a bridge. SW Collateral:  [address] -pending review, declined at [place];  [facility] -pending review  [facility]-pending review      Pt.'s case was discussed . Pt. Is waiting  to go to Substance use residential program. Pt. Will be dc today. SW met with pt to discuss dc plan Pt. Informed SW he is going to go and stay at his niece home oppose to crisis stabilization. SW discussed coping strategies,safety plan and aftercare. SW encouraged continued medication and treatment compliance. Pt was encouraged to continue to follow-up with  residential facilities regarding bed availability:   Patient is encouraged to follow-up with same day excess with   [facility]   [facility]      [phone]  fax [phone]. Patient is also encouraged to follow-up with the following Substance Abuse programs   that he was referred to for bed availability    [facility] [address]  [phone] Fax #:  [address] [facility] on 6/10/22   [address]   [address] [facility], [address]  Phone: [phone]      Pt.  Will require a bus pass        Isabella Morris MA, LMHP-R

## 2022-08-09 NOTE — GROUP NOTE
IP  GROUP DOCUMENTATION INDIVIDUAL                                                                          Group Therapy Note    Date: 8/8/2022    Group Start Time: 2000  Group End Time: 2015  Group Topic: Medication    [facility] ADULT CHEM DEP    Juliet Hui RN    IP [facility] GROUP DOCUMENTATION GROUP    Group Therapy Note    Attendees:6       Attendance: Attended    Patient's Goal:  Understanding the importance of medication compliance. Interventions/techniques: Informed    Follows Directions: Followed directions    Interactions: Interacted appropriately    Mental Status: Calm    Behavior/appearance: Cooperative    Goals Achieved:  Identified feelings      Argentina Bray RN

## 2022-08-09 NOTE — PROGRESS NOTES
Problem: Falls - Risk of  Goal: *Absence of Falls  Description: Document Rosa Stack Fall Risk and appropriate interventions in the flowsheet. 8/9/2022 1150 by Neftali Cowan RN  Outcome: Resolved/Met  8/9/2022 1038 by Neftali Cowan RN  Outcome: Progressing Towards Goal  Note: Fall Risk Interventions:                                Problem: Suicide  Goal: *STG: Remains safe in hospital  Description: Patient will remain safe while in hospital  8/9/2022 1150 by Neftali Cowan RN  Outcome: Resolved/Met  8/9/2022 1038 by Neftali Cowan RN  Outcome: Progressing Towards Goal  Goal: *STG: Attends activities and groups  Description: Patient will attend 3 groups a day while hospitalized. 8/9/2022 1150 by Neftali Cowan RN  Outcome: Resolved/Met  8/9/2022 1038 by Neftali Cowan RN  Outcome: Not Progressing Towards Goal  Goal: *STG:  Verbalizes alternative ways of dealing with maladaptive feelings/behaviors  Description: Patient will verbalized at least 3 alternative ways of dealing with maladaptive feelings daily while hospitalized.   Outcome: Resolved/Met  Goal: Interventions  Outcome: Resolved/Met

## 2022-08-10 NOTE — DISCHARGE SUMMARY
[facility]  Inpatient Psychiatry   Discharge Summary     Admit date: 8/2/2022    Discharge date and time: 8/9/2022 12:30 PM    Discharge Physician: Yamilet Harris MD    DISCHARGE DIAGNOSES     Psychiatric Diagnoses:   Patient Active Problem List   Diagnosis Code    Depressive disorder F32. A    Alcohol use disorder, severe, dependence ([facility] [address] F10.20    Cocaine use disorder, severe, dependence ([facility] F14.20       Level of impairment/disability: 1026 Nino Ignacio is a 61 y.o. BLACK/ male with a history of cocaine and alcohol use who was admitted voluntarily from our ED for suicidal ideation with plan to jump off a bridge. Patient is somewhat of a limited historian. His UDS was positive for cocaine and blood alcohol level was less than 3. Patient reports that he has been feeling depressed for the past 2 weeks and having suicidal ideation for the past 1 week due to social stressors. Patient states he is stressed out, homeless, bored with his life, feels worthless because he has nothing to do. He is currently on disability and has not been able to get regular part-time employment. 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 Patient states he has been homeless for almost 2 years. Prior to that he was living with his niece but they did not get along. He says he has enough money to be able to afford his own place but has just not had any luck in finding a place to live. He says he has siblings who live in the area but he does not want to live with any of them. The patient also endorses a long history of cocaine and alcohol use. He says he has never had any formal treatment for his drug and alcohol use and is interested in going to a rehab facility for treatment. He admits to drinking alcohol daily and started drinking around the age of 12.   He drinks at least a case of 16 ounce beers daily and a fifth of liquor. He said his last drink was 2 days ago. He denies withdrawal symptoms such as tremors, diaphoresis, nausea, vomiting or hallucinations. Nurse reported that CIWA score was 5 this morning and he received 1 mg of Ativan this morning. The patient is unable to report any significant period of time that he has been without alcohol. He also endorses using cocaine for the past 20 years. He says he smokes crack at least 4 times a week and last use was 5 days ago. He spends about $400 a week on crack cocaine. Longest period of sobriety was 9 months in 2019. Patient says he was not incarcerated during that time but just had the willpower to stay sober. He denies going to any rehab facility in the past.  He says he was involved in a drug program called [facility] last year where he was required to attend zoom meetings twice a week. He says he attended the program for about 6 weeks and did not complete it. The patient also endorses using half pack of cigarettes daily. He also uses marijuana daily and started using around the age of 12. He denies use of heroin. The patient reports chronic history of insomnia. He says he has never been able to sleep well and only gets about 3 to 4 hours of sleep on average. He denies problems with appetite, energy, concentration or anhedonia. He endorses feelings of helplessness and worthlessness. He denies psychotic symptoms, manic symptoms or symptoms related to PTSD. He denies past trauma. Hospital course: The patient was admitted and monitored for suicidal ideation. He received individual, group and medication therapy. He was prescribed nortriptyline which was titrated to 50 mg at bedtime for mood, sleep and pain. He was also prescribed perphenazine 8 mg at bedtime for hallucinations. The patient tolerated medications without adverse reaction or noticeable side effects. His mood improved quickly during the hospitalization.   He consistently denied suicidal ideation for several days prior to discharge. He still continued to endorse auditory hallucinations although he said it was mainly his voice speaking to him. He felt that the hallucinations were close to baseline and were not bothersome. He actively participated in group therapy sessions and was cooperative with the treatment plan. The patient wanted to be referred to residential substance use disorder treatment program and the  made referrals to a number of programs but they had not yet responded by the time of discharge. He was declined by the [facility]. We were still waiting to hear from [address] [place] [address], [facility] and [facility]. The patient was provided with contact information of these different facilities for him to follow up with them concerning bed availability as an outpatient. Mr. Alexus Aleman plans to go live with his niece while waiting for a bed to open up at a residential substance use disorder treatment center. He reports improvement in his mood and sleep. He denies suicidal ideation. He is motivated to maintain sobriety and plans to do that by avoiding certain places and people. Patient is deemed to be at a low acute risk of suicide at this time as he denies suicidal ideation, has euthymic mood and also denies prior history of suicide attempts. He has some family support. DISPOSITION/FOLLOW-UP     Disposition: Home    Follow-up Appointments: Follow-up Information       Follow up With Specialties Details Why Contact Info    None    None ([phone]) Patient stated that they have no PCP      [address]  Follow up  Patient is encouraged to follow-up with same day excess with   [address]   [address]      [phone]  fax [phone]. Pt.  Will require a bus pass              MEDICATION CHANGES   Outpatient medications:  No current facility-administered medications on file prior to encounter. No current outpatient medications on file prior to encounter. Discharged medication:  Discharge Medication List as of 8/9/2022 12:19 PM        START taking these medications    Details   nortriptyline (PAMELOR) 50 mg capsule Take 1 Capsule by mouth nightly. Indications: depression, Print, Disp-30 Capsule, R-0      perphenazine (TRILAFON) 8 mg tablet Take 1 Tablet by mouth nightly. Indications: nausea and vomiting, Print, Disp-30 Tablet, R-0             Instructions, risks (black box warning), benefits and side effects (EPS, TD, NMS) were discussed in detail prior to discharge. Patient denied any adverse medication side effects prior to discharge.        LABS/IMAGING DURING ADMISSION     Results for orders placed or performed during the hospital encounter of 08/02/22   COVID-19 WITH INFLUENZA A/B   Result Value Ref Range    SARS-CoV-2 by PCR Not detected NOTD      Influenza A by PCR Not detected NOTD      Influenza B by PCR Not detected NOTD     DRUG SCREEN, URINE   Result Value Ref Range    BENZODIAZEPINES Negative NEG      BARBITURATES Negative NEG      THC (TH-CANNABINOL) Negative NEG      OPIATES Negative NEG      PCP(PHENCYCLIDINE) Negative NEG      COCAINE Positive (A) NEG      AMPHETAMINES Negative NEG      METHADONE Negative NEG      HDSCOM (NOTE)    ETHYL ALCOHOL   Result Value Ref Range    ALCOHOL(ETHYL),SERUM <3 0 - 3 MG/DL   CBC WITH AUTOMATED DIFF   Result Value Ref Range    WBC 13.6 (H) 4.6 - 13.2 K/uL    RBC 4.48 4.35 - 5.65 M/uL    HGB 13.4 13.0 - 16.0 g/dL    HCT 39.9 36.0 - 48.0 %    MCV 89.1 78.0 - 100.0 FL    MCH 29.9 24.0 - 34.0 PG    MCHC 33.6 31.0 - 37.0 g/dL    RDW 15.5 (H) 11.6 - 14.5 %    PLATELET 291 337 - 598 K/uL    MPV 9.8 9.2 - 11.8 FL    NRBC 0.0 0  WBC    ABSOLUTE NRBC 0.00 0.00 - 0.01 K/uL    NEUTROPHILS 85 (H) 40 - 73 %    LYMPHOCYTES 9 (L) 21 - 52 %    MONOCYTES 4 3 - 10 %    EOSINOPHILS 1 0 - 5 %    BASOPHILS 0 0 - 2 %    IMMATURE GRANULOCYTES 0 0.0 - 0.5 %    ABS. NEUTROPHILS 11.6 (H) 1.8 - 8.0 K/UL    ABS. LYMPHOCYTES 1.3 0.9 - 3.6 K/UL    ABS. MONOCYTES 0.5 0.05 - 1.2 K/UL    ABS. EOSINOPHILS 0.1 0.0 - 0.4 K/UL    ABS. BASOPHILS 0.0 0.0 - 0.1 K/UL    ABS. IMM. GRANS. 0.1 (H) 0.00 - 0.04 K/UL    DF AUTOMATED     METABOLIC PANEL, BASIC   Result Value Ref Range    Sodium 137 136 - 145 mmol/L    Potassium 4.1 3.5 - 5.5 mmol/L    Chloride 101 100 - 111 mmol/L    CO2 31 21 - 32 mmol/L    Anion gap 5 3.0 - 18 mmol/L    Glucose 92 74 - 99 mg/dL    BUN 14 7.0 - 18 MG/DL    Creatinine 0.95 0.6 - 1.3 MG/DL    BUN/Creatinine ratio 15 12 - 20      GFR est AA >60 >60 ml/min/1.73m2    GFR est non-AA >60 >60 ml/min/1.73m2    Calcium 9.5 8.5 - 10.1 MG/DL   HEPATIC FUNCTION PANEL   Result Value Ref Range    Protein, total 7.3 6.4 - 8.2 g/dL    Albumin 3.5 3.4 - 5.0 g/dL    Globulin 3.8 2.0 - 4.0 g/dL    A-G Ratio 0.9 0.8 - 1.7      Bilirubin, total 0.4 0.2 - 1.0 MG/DL    Bilirubin, direct 0.1 0.0 - 0.2 MG/DL    Alk. phosphatase 104 45 - 117 U/L    AST (SGOT) 17 10 - 38 U/L    ALT (SGPT) 16 16 - 61 U/L   TSH 3RD GENERATION   Result Value Ref Range    TSH 0.84 0.36 - 3.74 uIU/mL   HEMOGLOBIN A1C WITH EAG   Result Value Ref Range    Hemoglobin A1c 5.4 4.2 - 5.6 %    Est. average glucose 108 mg/dL   EKG, 12 LEAD, INITIAL   Result Value Ref Range    Ventricular Rate 74 BPM    Atrial Rate 74 BPM    P-R Interval 176 ms    QRS Duration 76 ms    Q-T Interval 364 ms    QTC Calculation (Bezet) 404 ms    Calculated P Axis 74 degrees    Calculated R Axis 72 degrees    Calculated T Axis 57 degrees    Diagnosis       Normal sinus rhythm  Normal ECG  No previous ECGs available  Confirmed by Carlos Hairston M.D., [address] [place] [address] ([id number]) on 8/6/2022 10:08:42 PM          DISCHARGE MENTAL STATUS EVALUATION     Appearance/Hygiene 61 y.o.  BLACK/ male  Hygiene: Fair   Attitude/Behavior/Social Relatedness Appropriate   Musculoskeletal Gait/Station: appropriate  Tone (flaccid, cogwheeling, spastic): not assessed  Psychomotor (hyperkinetic, hypokinetic): calm  Involuntary movements (tics, dyskinesias, akathisa, stereotypies): none   Speech   Rate, rhythm, volume, fluency and articulation are appropriate   Mood   euthymic   Affect    congruent   Thought Process Linear and goal directed   Thought Content and Perceptual Disturbances Denies self-injurious behavior (SIB), suicidal ideation (SI), aggressive behavior or homicidal ideation (HI)    Endorsing auditory hallucinations   Sensorium and Cognition  Grossly intact   Insight  fair   Judgment fair           Completion of discharge was greater than 30 minutes. Over 50% of today's discharge was geared towards counseling and coordination of care.           Rachel Fernandez MD  Psychiatry  [facility]

## 2022-12-25 ENCOUNTER — APPOINTMENT (OUTPATIENT)
Dept: GENERAL RADIOLOGY | Age: 57
End: 2022-12-25
Attending: PHYSICIAN ASSISTANT
Payer: COMMERCIAL

## 2022-12-25 ENCOUNTER — HOSPITAL ENCOUNTER (EMERGENCY)
Age: 57
Discharge: HOME | End: 2022-12-25
Attending: EMERGENCY MEDICINE
Payer: COMMERCIAL

## 2022-12-25 VITALS
SYSTOLIC BLOOD PRESSURE: 108 MMHG | WEIGHT: 160 LBS | TEMPERATURE: 98.3 F | DIASTOLIC BLOOD PRESSURE: 90 MMHG | HEART RATE: 79 BPM | HEIGHT: 69 IN | OXYGEN SATURATION: 99 % | RESPIRATION RATE: 16 BRPM | BODY MASS INDEX: 23.7 KG/M2

## 2022-12-25 DIAGNOSIS — S92.902A CLOSED FRACTURE OF LEFT FOOT, INITIAL ENCOUNTER: Primary | ICD-10-CM

## 2022-12-25 PROCEDURE — 75810000053 HC SPLINT APPLICATION

## 2022-12-25 PROCEDURE — 73630 X-RAY EXAM OF FOOT: CPT

## 2022-12-25 PROCEDURE — 74011250637 HC RX REV CODE- 250/637: Performed by: PHYSICIAN ASSISTANT

## 2022-12-25 PROCEDURE — 99283 EMERGENCY DEPT VISIT LOW MDM: CPT

## 2022-12-25 RX ORDER — ACETAMINOPHEN 500 MG
1000 TABLET ORAL
Status: COMPLETED | OUTPATIENT
Start: 2022-12-25 | End: 2022-12-25

## 2022-12-25 RX ORDER — ACETAMINOPHEN 500 MG
1000 TABLET ORAL
Qty: 20 TABLET | Refills: 0 | Status: SHIPPED | OUTPATIENT
Start: 2022-12-25

## 2022-12-25 RX ADMIN — ACETAMINOPHEN 1000 MG: 500 TABLET ORAL at 12:32

## 2022-12-25 NOTE — Clinical Note
[address] [place] [address] [address] [facility] EMERGENCY DEPT  [address] [address] [phone]    Work/School Note    Date: 12/25/2022    To Whom It May concern:    Pamella Calvo was seen and treated today in the emergency room by the following provider(s):  Attending Provider: Gerson Gama MD  Physician Assistant: Lillian Rocha. Pamella Calvo is excused from work/school on 12/25/2022 through 12/27/2022. He is medically clear to return to work/school on 12/28/2022.          Sincerely,          ROSARIO Fallon

## 2022-12-25 NOTE — ED PROVIDER NOTES
EMERGENCY DEPARTMENT HISTORY AND PHYSICAL EXAM    10:55 AM      Date: 12/25/2022  Patient Name: Kenya Ortega    History of Presenting Illness     Chief Complaint   Patient presents with    Foot Injury           History Provided By: Patient, EMS     Additional History (Context): Kenya Ortega is a 61 y.o. male with No significant past medical history who presents with c/o L mid-foot pain x 2 days. Patient notes he \"stepped off a curb wrong\" last night. Patient notes pain is worse with movement. Did not take any medication for the symptoms prior to arrival.  Denies fall, leg swelling or discoloration, numbness or tingling. Denies alleviating factors. PCP: None    Current Outpatient Medications   Medication Sig Dispense Refill    acetaminophen (Tylenol Extra Strength) 500 mg tablet Take 2 Tablets by mouth every six (6) hours as needed for Pain. 20 Tablet 0    nortriptyline (PAMELOR) 50 mg capsule Take 1 Capsule by mouth nightly. Indications: depression 30 Capsule 0    perphenazine (TRILAFON) 8 mg tablet Take 1 Tablet by mouth nightly. Indications: nausea and vomiting 30 Tablet 0       Past History     Past Medical History:  No past medical history on file. Past Surgical History:  Past Surgical History:   Procedure Laterality Date    HX ORTHOPAEDIC Left 2019/2020    left knee        Family History:  No family history on file. Social History:  Social History     Tobacco Use    Smoking status: Every Day     Packs/day: 1.00     Types: Cigarettes    Smokeless tobacco: Never   Substance Use Topics    Alcohol use: Yes     Comment: socially    Drug use: Never       Allergies:  No Known Allergies      Review of Systems       Review of Systems   Constitutional:  Negative for chills and fever. Respiratory:  Negative for shortness of breath. Cardiovascular:  Negative for chest pain. Gastrointestinal:  Negative for abdominal pain, nausea and vomiting.    Musculoskeletal:  Positive for arthralgias, joint swelling and myalgias. Skin:  Negative for rash. Neurological:  Negative for weakness. All other systems reviewed and are negative. Physical Exam   Visit Vitals  BP (!) 108/90 (BP 1 Location: Left upper arm, BP Patient Position: Sitting)   Pulse 79   Temp 98.3 °F (36.8 °C)   Resp 16   Ht 5' 9\" (1.753 m)   Wt 72.6 kg (160 lb)   SpO2 99%   BMI 23.63 kg/m²         Physical Exam  Vitals and nursing note reviewed. Constitutional:       General: He is not in acute distress. Appearance: Normal appearance. He is well-developed. He is not ill-appearing, toxic-appearing or diaphoretic. HENT:      Head: Normocephalic and atraumatic. Cardiovascular:      Rate and Rhythm: Normal rate and regular rhythm. Heart sounds: Normal heart sounds. No murmur heard. No friction rub. No gallop. Pulmonary:      Effort: Pulmonary effort is normal. No respiratory distress. Breath sounds: Normal breath sounds. No wheezing or rales. Musculoskeletal:         General: Normal range of motion. Cervical back: Normal range of motion and neck supple. Left lower leg: Normal.      Left ankle: Normal.      Left foot: Normal capillary refill. Swelling (mild, mid-foot) and bony tenderness present. No deformity or crepitus. Normal pulse. Comments: Dorsalis pedis 2+    Skin:     General: Skin is warm. Findings: No rash. Neurological:      Mental Status: He is alert. Diagnostic Study Results     Labs -  No results found for this or any previous visit (from the past 12 hour(s)). Radiologic Studies -   XR FOOT LT MIN 3 V   Final Result      1. Third and fourth metatarsal fractures as above. 2.  Soft tissue swelling. 3.  Chronic findings as above            Medical Decision Making   I am the first provider for this patient. I reviewed the vital signs, available nursing notes, past medical history, past surgical history, family history and social history.     Vital Signs-Reviewed the patient's vital signs. Records Reviewed: Nursing Notes and Old Medical Records (Time of Review: 10:55 AM)    ED Course: Progress Notes, Reevaluation, and Consults:  2:40 PM: Reviewed results and plan with patient. Discussed need for close outpatient follow-up this week with orthopedic for reassessment. Discussed strict return precautions, including numbness, weakness, or any other medical concerns. Pending posterior short leg splint and crutches. Provider Notes (Medical Decision Making): 78-year-old male who presents to the ED due to left midfoot pain x2 days after injury. Extremity neurovascularly intact. No skin breakdown or ecchymosis. X-ray demonstrates third and fourth metatarsal fracture. Posterior short leg splint and crutches provided. Patient is stable for discharge with close outpatient follow-up with orthopedic for further assessment. Strict return precautions provided    Diagnosis     Clinical Impression:   1. Closed fracture of left foot, initial encounter        Disposition: home     Follow-up Information       Follow up With Specialties Details Why [address] [facility] EMERGENCY DEPT Emergency Medicine  If symptoms worsen [address]  [address]  Schedule an appointment as soon as possible for a visit   Emeli  [phone]             Discharge Medication List as of 12/25/2022  2:40 PM        START taking these medications    Details   acetaminophen (Tylenol Extra Strength) 500 mg tablet Take 2 Tablets by mouth every six (6) hours as needed for Pain., Normal, Disp-20 Tablet, R-0           CONTINUE these medications which have NOT CHANGED    Details   nortriptyline (PAMELOR) 50 mg capsule Take 1 Capsule by mouth nightly. Indications: depression, Print, Disp-30 Capsule, R-0      perphenazine (TRILAFON) 8 mg tablet Take 1 Tablet by mouth nightly.  Indications: nausea and vomiting, Print, Disp-30 Tablet, R-0             Dictation disclaimer:  Please note that this dictation was completed with JetSuite, the computer voice recognition software. Quite often unanticipated grammatical, syntax, homophones, and other interpretive errors are inadvertently transcribed by the computer software. Please disregard these errors. Please excuse any errors that have escaped final proofreading.

## 2022-12-25 NOTE — Clinical Note
[address] [place] [address] Dr [facility] EMERGENCY DEPT  [address] [address] [phone]    Work/School Note    Date: 12/25/2022    To Whom It May concern:    Erich Hernandez was seen and treated today in the emergency room by the following provider(s):  Attending Provider: Gracie Cheng MD  Physician Assistant: Devan Zavala, [address] [place], [place] [address]pantera Tran. Erich Hernandez is excused from work/school on 12/25/2022 through 12/27/2022. He is medically clear to return to work/school on 12/28/2022.          Sincerely,          ROSARIO Mijares

## 2022-12-25 NOTE — ED TRIAGE NOTES
Pt Presents to the ED w/ CC of L foot pain from stepping off of curb last night. Pt states he has no motor control from L knee down due to surgery and it will occasionally give out due to this. Movement causes pain, pt denies taking any medication for pain. Describes pain as sharp needles @ 10/10 severity. Pain is constant throughout the day.

## 2022-12-25 NOTE — DISCHARGE INSTRUCTIONS
Take medication as prescribed. Follow-up with the orthopedic physician within 2 days for reassessment. Bring the results from this visit with you for their review.  Return to the ED immediately for any new, worsening, or persistent symptoms

## 2024-05-19 NOTE — H&P
"Josué Pastor is a 58 y.o. year old male patient who is on U admission day 6.      Subjective   Josué Pastor is a 58 y.o. year old male patient who was personally seen and interviewed, and discussed in morning team rounds. The patient was interviewed at the end of the hallway (interviewed sitting in a chair), and was easily engaged and cooperative. This morning, Josué reports feeling \"good\" and currently rates his depression at a 0 out of 10. He denies having suicidal ideation or suicide plans this morning. He also rates his anxiety at a 0 out of 10. No hallucinations or paranoia were endorsed or noted.   Josué slept 6 hours last night (broken).         Objective   Mental Status Exam:   General: Appropriately groomed and dressed in casual/hospital attire.   Appearance: Appears stated age.   Attitude: Calm, cooperative.   Behavior: Appropriate eye contact.   Motor Activity: No agitation or retardation. No EPS/TD. Normal gait and station. Normal muscle tone and bulk.   Speech: Regular rate, rhythm, volume and tone, spontaneous, fluent. Non-pressured.   Mood: \"Good\"   Affect: Neutral.   Thought Process: Organized, and goal directed.   Thought Content: Does not currently endorse suicidal ideation or any suicide plans.  Does not endorse homicidal ideation.  No overt delusions or paranoia elicited.    Thought Perception: Does not endorse auditory or visual hallucinations, does not appear to be responding to hallucinatory stimuli.   Cognition: Alert, oriented x 3. No deficits noted. Adequate fund of knowledge. No deficit in recent and remote memory. No deficits in attention, concentration or language.   Insight: Fair, as patient recognizes symptoms of illness and need for recommended treatments.    Judgment: Poor-to-Fair, as patient can make necessarily make reasonable decisions about ordinary activities of daily living and necessary medical care recommendations.         LABS:  Results for orders placed or " [address] [facility] [address]  Inpatient Admission Note    Date of Service:  08/03/22    Historian(s): [address] and chart review  Referral Source: [facility] ED    Chief Complaint   Suicidal ideation    History of Present Illness     [address] Oneill is a 61 y.o. BLACK/ male with a history of cocaine and alcohol use who was admitted voluntarily from our ED for suicidal ideation with plan to jump off a bridge. Patient is somewhat of a limited historian. His UDS was positive for cocaine and blood alcohol level was less than 3. Patient reports that he has been feeling depressed for the past 2 weeks and having suicidal ideation for the past 1 week due to social stressors. Patient states he is stressed out, homeless, bored with his life, feels worthless because he has nothing to do. He is currently on disability and has not been able to get regular part-time employment. 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 Patient states he has been homeless for almost 2 years. Prior to that he was living with his niece but they did not get along. He says he has enough money to be able to afford his own place but has just not had any luck in finding a place to live. He says he has siblings who live in the area but he does not want to live with any of them. The patient also endorses a long history of cocaine and alcohol use. He says he has never had any formal treatment for his drug and alcohol use and is interested in going to a rehab facility for treatment. He admits to drinking alcohol daily and started drinking around the age of 12. He drinks at least a case of 16 ounce beers daily and a fifth of liquor. He said his last drink was 2 days ago. He denies withdrawal symptoms such as tremors, diaphoresis, nausea, vomiting or hallucinations.   Nurse reported that CIWA score was 5 this morning and he received 1 mg of Ativan this morning. The patient is unable to report any significant period of time that he has been without alcohol. He also endorses using cocaine for the past 20 years. He says he smokes crack at least 4 times a week and last use was 5 days ago. He spends about $400 a week on crack cocaine. Longest period of sobriety was 9 months in 2019. Patient says he was not incarcerated during that time but just had the willpower to stay sober. He denies going to any rehab facility in the past.  He says he was involved in a drug program called [facility] last year where he was required to attend zoom meetings twice a week. He says he attended the program for about 6 weeks and did not complete it. The patient also endorses using half pack of cigarettes daily. He also uses marijuana daily and started using around the age of 12. He denies use of heroin. The patient reports chronic history of insomnia. He says he has never been able to sleep well and only gets about 3 to 4 hours of sleep on average. He denies problems with appetite, energy, concentration or anhedonia. He endorses feelings of helplessness and worthlessness. He denies psychotic symptoms, manic symptoms or symptoms related to PTSD. He denies past trauma. Medical Review of Systems     Constitutional: All systems reviewed and are negative except for pain in shoulders bilaterally and left leg. Patient said he has admitted knee surgery on his left knee and continues to have pain there. Psychiatric Treatment History     This is the patient's first psychiatric hospitalization. He denies any history of prior inpatient or outpatient psychiatric treatment. He says he has never been medicated with an antidepressant or psychotropic medication. He denies history of prior suicide attempts. Allergies    No Known Allergies    Medical History     No past medical history on file. Left knee surgery and chronic pain in left knee and leg.   Chronic performed during the hospital encounter of 05/13/24 (from the past 96 hour(s))   POCT GLUCOSE   Result Value Ref Range    POCT Glucose 173 (H) 74 - 99 mg/dL   POCT GLUCOSE   Result Value Ref Range    POCT Glucose 207 (H) 74 - 99 mg/dL   POCT GLUCOSE   Result Value Ref Range    POCT Glucose 208 (H) 74 - 99 mg/dL   POCT GLUCOSE   Result Value Ref Range    POCT Glucose 121 (H) 74 - 99 mg/dL   POCT GLUCOSE   Result Value Ref Range    POCT Glucose 166 (H) 74 - 99 mg/dL   POCT GLUCOSE   Result Value Ref Range    POCT Glucose 152 (H) 74 - 99 mg/dL   POCT GLUCOSE   Result Value Ref Range    POCT Glucose 165 (H) 74 - 99 mg/dL   POCT GLUCOSE   Result Value Ref Range    POCT Glucose 145 (H) 74 - 99 mg/dL   POCT GLUCOSE   Result Value Ref Range    POCT Glucose 155 (H) 74 - 99 mg/dL   POCT GLUCOSE   Result Value Ref Range    POCT Glucose 198 (H) 74 - 99 mg/dL   POCT GLUCOSE   Result Value Ref Range    POCT Glucose 168 (H) 74 - 99 mg/dL   POCT GLUCOSE   Result Value Ref Range    POCT Glucose 130 (H) 74 - 99 mg/dL   POCT GLUCOSE   Result Value Ref Range    POCT Glucose 166 (H) 74 - 99 mg/dL   POCT GLUCOSE   Result Value Ref Range    POCT Glucose 180 (H) 74 - 99 mg/dL   POCT GLUCOSE   Result Value Ref Range    POCT Glucose 183 (H) 74 - 99 mg/dL   POCT GLUCOSE   Result Value Ref Range    POCT Glucose 117 (H) 74 - 99 mg/dL        Last Recorded Vitals  Visit Vitals  /81 (BP Location: Right arm, Patient Position: Sitting)   Pulse 60   Temp 36 °C (96.8 °F) (Temporal)   Resp 16        Intake/Output last 3 Shifts:  No intake/output data recorded.    Relevant Results  Scheduled medications  aspirin, 81 mg, oral, Daily  atorvastatin, 80 mg, oral, Daily  gabapentin, 300 mg, oral, Daily  insulin glargine, 30 Units, subcutaneous, Nightly  metFORMIN XR, 1,000 mg, oral, BID  metoprolol succinate XL, 100 mg, oral, Daily  sertraline, 200 mg, oral, Daily  triamcinolone, , Topical, BID      Continuous medications     PRN medications  PRN  insomnia. Medication(s)     None         Substance Abuse History     See HPI  Family History     No family history on file. Psychiatric Family History  The patient denies family history of mental illness or suicide attempts. Social History     The patient was born and raised in [place]. He was raised by his mother. He is single and has never been . He states he has a 70-year-old son with whom he has contact. He says he has 5 other siblings who live in the area. He has 1/10 grade education. The patient reports that he was last employed over 20 years ago and is on disability for knee problems. He also mentions that he has been to intermediate a lot of times and was last released about 3 years ago on parole.   Vitals/Labs      Vitals:    08/03/22 0134 08/03/22 0245 08/03/22 0757 08/03/22 1154   BP: 122/74 100/70 108/77 112/75   Pulse: 65 73 62 72   Resp: 17 18 20 17   Temp: 97 °F (36.1 °C) 97.6 °F (36.4 °C) 97.7 °F (36.5 °C) (!) 96.6 °F (35.9 °C)   SpO2: 100% 100%     Weight:       Height:           Labs:   Results for orders placed or performed during the hospital encounter of 08/02/22   COVID-19 WITH INFLUENZA A/B   Result Value Ref Range    SARS-CoV-2 by PCR Not detected NOTD      Influenza A by PCR Not detected NOTD      Influenza B by PCR Not detected NOTD     DRUG SCREEN, URINE   Result Value Ref Range    BENZODIAZEPINES Negative NEG      BARBITURATES Negative NEG      THC (TH-CANNABINOL) Negative NEG      OPIATES Negative NEG      PCP(PHENCYCLIDINE) Negative NEG      COCAINE Positive (A) NEG      AMPHETAMINES Negative NEG      METHADONE Negative NEG      HDSCOM (NOTE)    ETHYL ALCOHOL   Result Value Ref Range    ALCOHOL(ETHYL),SERUM <3 0 - 3 MG/DL   CBC WITH AUTOMATED DIFF   Result Value Ref Range    WBC 13.6 (H) 4.6 - 13.2 K/uL    RBC 4.48 4.35 - 5.65 M/uL    HGB 13.4 13.0 - 16.0 g/dL    HCT 39.9 36.0 - 48.0 %    MCV 89.1 78.0 - 100.0 FL    MCH 29.9 24.0 - 34.0 PG    MCHC 33.6 31.0 - 37.0 g/dL medications: acetaminophen, alum-mag hydroxide-simeth, diphenhydrAMINE **OR** diphenhydrAMINE, haloperidol **OR** haloperidol lactate, hydrOXYzine pamoate, LORazepam **OR** LORazepam, artificial tears, melatonin, psyllium               Assessment/Plan   1) Other Specified Depressive Disorder       Plan: 1) Zoloft to 100 ->150 -> 175 -> 200 mg Qdaily (continue)                2) Group and milieu therapy  Discussed potential risks, benefits, and alternatives to medications with patient, who consented to the above medications.     2) PTSD, chronic       Plan: 1) see above     3) Alcohol Use Disorder, moderate, dependence       Plan: 1) Refer to outpatient treatment program.      4) Diabetic Retinopathy        Plan: 1) IM service to follow and manage.     5) Coronary Artery Bypass Graft       Plan: 1) IM service to follow and manage.            Jason Louis MD    RDW 15.5 (H) 11.6 - 14.5 %    PLATELET 220 175 - 462 K/uL    MPV 9.8 9.2 - 11.8 FL    NRBC 0.0 0  WBC    ABSOLUTE NRBC 0.00 0.00 - 0.01 K/uL    NEUTROPHILS 85 (H) 40 - 73 %    LYMPHOCYTES 9 (L) 21 - 52 %    MONOCYTES 4 3 - 10 %    EOSINOPHILS 1 0 - 5 %    BASOPHILS 0 0 - 2 %    IMMATURE GRANULOCYTES 0 0.0 - 0.5 %    ABS. NEUTROPHILS 11.6 (H) 1.8 - 8.0 K/UL    ABS. LYMPHOCYTES 1.3 0.9 - 3.6 K/UL    ABS. MONOCYTES 0.5 0.05 - 1.2 K/UL    ABS. EOSINOPHILS 0.1 0.0 - 0.4 K/UL    ABS. BASOPHILS 0.0 0.0 - 0.1 K/UL    ABS. IMM. GRANS. 0.1 (H) 0.00 - 0.04 K/UL    DF AUTOMATED     METABOLIC PANEL, BASIC   Result Value Ref Range    Sodium 137 136 - 145 mmol/L    Potassium 4.1 3.5 - 5.5 mmol/L    Chloride 101 100 - 111 mmol/L    CO2 31 21 - 32 mmol/L    Anion gap 5 3.0 - 18 mmol/L    Glucose 92 74 - 99 mg/dL    BUN 14 7.0 - 18 MG/DL    Creatinine 0.95 0.6 - 1.3 MG/DL    BUN/Creatinine ratio 15 12 - 20      GFR est AA >60 >60 ml/min/1.73m2    GFR est non-AA >60 >60 ml/min/1.73m2    Calcium 9.5 8.5 - 10.1 MG/DL       Mental Status Examination     Appearance/Hygiene 61 y.o.  BLACK/ male  Hygiene: Fair   Behavior/Social Relatedness Appropriate   Musculoskeletal Gait/Station: Limping on the left leg  Tone (flaccid, cogwheeling, spastic): not assessed  Psychomotor (hyperkinetic, hypokinetic): calm  Involuntary movements (tics, dyskinesias, akathisa, stereotypies): none   Speech   Rate, rhythm, volume, fluency and articulation are appropriate   Mood   depressed   Affect    depressed   Thought Process Linear and goal directed    Vagueness, incoherence, circumstantiality, tangentiality, neologisms, perseveration, flight of ideas, or self-contradictory statements not present on assessment   Thought Content and Perceptual Disturbances Denies delusions, ideas of reference, overvalued ideas, ruminations, obsession, compulsions, and phobias    He endorses suicidal ideation with plan to jump off a bridge    Denies auditory and visual hallucinations   Sensorium and Cognition  A&Ox4, attention intact, memory intact, language use appropriate, and fund of knowledge age appropriate   Insight  fair   Judgment fair       Suicide Risk Assessment     Admission  Date/Time: 08/03/22    [x] Admission  [] Discharge     Risk factors for suicide include male sex, age, depressed mood, excessive use of alcohol and drugs, lack of spouse and endorsing suicidal ideations with plan. The patient says he has supportive siblings. He denies access to guns and denies history of prior suicide attempts. He is at a moderate to high risk of suicide due to above risk factors. Assessment and Plan     Psychiatric Diagnoses:   Patient Active Problem List   Diagnosis Code    Depressive disorder F32. A    Alcohol use disorder, severe, dependence ([facility] [address] F10.20    Cocaine use disorder, severe, dependence ([facility]) F14.20       Level of impairment/disability: Severe Wyvonnia Bream is a 61 y.o. who is currently requiring acute stabilization after endorsing suicidal ideation. Admit to locked inpatient behavioral health unit. Start milieu, group, art and occupation therapy. Start nortriptyline 25 mg at bedtime for mood, insomnia and pain. Routine labs ordered and reviewed by this provider. Check hemoglobin A1c, hepatic function panel and TSH. Consider adding naltrexone after checking liver function. Reviewed instructions, risks, benefits and side effects. Start disposition planning;  to make referral to rehab facilities for further substance use disorder treatment.    Tentative date of discharge: 5-7 days       Fernando Galicia MD  [address] [facility]

## 2025-02-07 ENCOUNTER — APPOINTMENT (OUTPATIENT)
Facility: HOSPITAL | Age: 60
End: 2025-02-07
Payer: COMMERCIAL

## 2025-02-07 ENCOUNTER — HOSPITAL ENCOUNTER (EMERGENCY)
Facility: HOSPITAL | Age: 60
Discharge: HOME | End: 2025-02-07
Payer: COMMERCIAL

## 2025-02-07 VITALS
HEART RATE: 98 BPM | HEIGHT: 69 IN | SYSTOLIC BLOOD PRESSURE: 118 MMHG | TEMPERATURE: 98.9 F | BODY MASS INDEX: 24.29 KG/M2 | WEIGHT: 164 LBS | RESPIRATION RATE: 18 BRPM | OXYGEN SATURATION: 95 % | DIASTOLIC BLOOD PRESSURE: 79 MMHG

## 2025-02-07 DIAGNOSIS — J10.1 INFLUENZA A: Primary | ICD-10-CM

## 2025-02-07 LAB
FLUAV RNA SPEC QL NAA+PROBE: DETECTED
FLUBV RNA SPEC QL NAA+PROBE: NOT DETECTED
SARS-COV-2 RNA RESP QL NAA+PROBE: NOT DETECTED
SOURCE: ABNORMAL

## 2025-02-07 PROCEDURE — 99284 EMERGENCY DEPT VISIT MOD MDM: CPT

## 2025-02-07 PROCEDURE — 6370000000 HC RX 637 (ALT 250 FOR IP): Performed by: PHYSICIAN ASSISTANT

## 2025-02-07 PROCEDURE — 87636 SARSCOV2 & INF A&B AMP PRB: CPT

## 2025-02-07 PROCEDURE — 71046 X-RAY EXAM CHEST 2 VIEWS: CPT

## 2025-02-07 RX ORDER — IPRATROPIUM BROMIDE AND ALBUTEROL SULFATE 2.5; .5 MG/3ML; MG/3ML
1 SOLUTION RESPIRATORY (INHALATION)
Status: COMPLETED | OUTPATIENT
Start: 2025-02-07 | End: 2025-02-07

## 2025-02-07 RX ORDER — DOXYCYCLINE HYCLATE 100 MG
100 TABLET ORAL 2 TIMES DAILY
Qty: 14 TABLET | Refills: 0 | Status: SHIPPED | OUTPATIENT
Start: 2025-02-07 | End: 2025-02-14

## 2025-02-07 RX ORDER — ALBUTEROL SULFATE 90 UG/1
2 INHALANT RESPIRATORY (INHALATION) 4 TIMES DAILY PRN
Qty: 54 G | Refills: 1 | Status: SHIPPED | OUTPATIENT
Start: 2025-02-07

## 2025-02-07 RX ORDER — BENZONATATE 100 MG/1
100 CAPSULE ORAL 2 TIMES DAILY PRN
Qty: 14 CAPSULE | Refills: 0 | Status: SHIPPED | OUTPATIENT
Start: 2025-02-07 | End: 2025-02-14

## 2025-02-07 RX ORDER — ACETAMINOPHEN 500 MG
1000 TABLET ORAL EVERY 6 HOURS PRN
Qty: 56 TABLET | Refills: 0 | Status: SHIPPED | OUTPATIENT
Start: 2025-02-07 | End: 2025-02-14

## 2025-02-07 RX ADMIN — IPRATROPIUM BROMIDE AND ALBUTEROL SULFATE 1 DOSE: .5; 3 SOLUTION RESPIRATORY (INHALATION) at 13:05

## 2025-02-07 ASSESSMENT — ENCOUNTER SYMPTOMS
SHORTNESS OF BREATH: 0
ABDOMINAL PAIN: 0
NAUSEA: 0
DIARRHEA: 1
VOMITING: 0
COUGH: 1

## 2025-02-07 NOTE — ED PROVIDER NOTES
EMERGENCY DEPARTMENT HISTORY AND PHYSICAL EXAM    3:34 PM      Date: 2/7/2025  Patient Name: Nitesh Thrasher    History of Presenting Illness     Chief Complaint   Patient presents with    Cough    Generalized Body Aches    Diarrhea       History Provided By: Patient    Additional History (Context): Nitesh Thrasher is a 59 y.o. male with No past medical history on file.} who presents with complaint of myalgias, chills, dry cough, congestion, and diarrhea x 3 days.  Patient denies sick contacts, known exposure to COVID or the flu.  Patient notes he does smoke.  Patient denies history of cardiac or lung disease. Denies any other concerns or complaints.     PCP: No primary care provider on file.    No current facility-administered medications for this encounter.     Current Outpatient Medications   Medication Sig Dispense Refill    benzonatate (TESSALON) 100 MG capsule Take 1 capsule by mouth 2 times daily as needed for Cough 14 capsule 0    albuterol sulfate HFA (VENTOLIN HFA) 108 (90 Base) MCG/ACT inhaler Inhale 2 puffs into the lungs 4 times daily as needed for Wheezing 54 g 1    acetaminophen (TYLENOL) 500 MG tablet Take 2 tablets by mouth every 6 hours as needed for Pain 56 tablet 0    doxycycline hyclate (VIBRA-TABS) 100 MG tablet Take 1 tablet by mouth 2 times daily for 7 days 14 tablet 0       Past History     Past Medical History:  No past medical history on file.    Past Surgical History:  Past Surgical History:   Procedure Laterality Date    ORTHOPEDIC SURGERY Left 2019/2020    left knee        Family History:  No family history on file.    Social History:  Social History     Tobacco Use    Smoking status: Every Day     Current packs/day: 1.00     Types: Cigarettes    Smokeless tobacco: Never   Substance Use Topics    Alcohol use: Yes    Drug use: Never       Allergies:  No Known Allergies      Review of Systems       Review of Systems   Constitutional:  Negative for chills and fever.   HENT:  Positive

## 2025-02-07 NOTE — ED NOTES
Pt provided AVS and d/c instructions by provider. No further concerns. Pt discharged home in no distress.

## 2025-02-07 NOTE — DISCHARGE INSTRUCTIONS
Take medication as prescribed. Follow-up with your primary care physician within 2 days for reassessment. Bring the results from this visit with you for their review. Return to the ED immediately for any new, worsening, or persistent symptoms, including fever, shortness of breath, or any other medical concerns.